# Patient Record
Sex: MALE | Race: WHITE | NOT HISPANIC OR LATINO | URBAN - METROPOLITAN AREA
[De-identification: names, ages, dates, MRNs, and addresses within clinical notes are randomized per-mention and may not be internally consistent; named-entity substitution may affect disease eponyms.]

---

## 2018-03-16 ENCOUNTER — OUTPATIENT (OUTPATIENT)
Dept: OUTPATIENT SERVICES | Facility: HOSPITAL | Age: 78
LOS: 1 days | End: 2018-03-16
Payer: COMMERCIAL

## 2018-03-16 VITALS
HEIGHT: 68 IN | RESPIRATION RATE: 15 BRPM | TEMPERATURE: 98 F | HEART RATE: 70 BPM | WEIGHT: 171.96 LBS | DIASTOLIC BLOOD PRESSURE: 80 MMHG | SYSTOLIC BLOOD PRESSURE: 146 MMHG

## 2018-03-16 DIAGNOSIS — Z01.818 ENCOUNTER FOR OTHER PREPROCEDURAL EXAMINATION: ICD-10-CM

## 2018-03-16 DIAGNOSIS — Z41.9 ENCOUNTER FOR PROCEDURE FOR PURPOSES OTHER THAN REMEDYING HEALTH STATE, UNSPECIFIED: Chronic | ICD-10-CM

## 2018-03-16 DIAGNOSIS — M48.061 SPINAL STENOSIS, LUMBAR REGION WITHOUT NEUROGENIC CLAUDICATION: ICD-10-CM

## 2018-03-16 LAB
ALBUMIN SERPL ELPH-MCNC: 3.7 G/DL — SIGNIFICANT CHANGE UP (ref 3.3–5)
ALP SERPL-CCNC: 74 U/L — SIGNIFICANT CHANGE UP (ref 40–120)
ALT FLD-CCNC: 31 U/L — SIGNIFICANT CHANGE UP (ref 12–78)
ANION GAP SERPL CALC-SCNC: 5 MMOL/L — SIGNIFICANT CHANGE UP (ref 5–17)
APTT BLD: 32.8 SEC — SIGNIFICANT CHANGE UP (ref 27.5–37.4)
AST SERPL-CCNC: 26 U/L — SIGNIFICANT CHANGE UP (ref 15–37)
BILIRUB SERPL-MCNC: 0.6 MG/DL — SIGNIFICANT CHANGE UP (ref 0.2–1.2)
BUN SERPL-MCNC: 27 MG/DL — HIGH (ref 7–23)
CALCIUM SERPL-MCNC: 8.8 MG/DL — SIGNIFICANT CHANGE UP (ref 8.5–10.1)
CHLORIDE SERPL-SCNC: 109 MMOL/L — HIGH (ref 96–108)
CO2 SERPL-SCNC: 29 MMOL/L — SIGNIFICANT CHANGE UP (ref 22–31)
CREAT SERPL-MCNC: 1.4 MG/DL — HIGH (ref 0.5–1.3)
GLUCOSE SERPL-MCNC: 92 MG/DL — SIGNIFICANT CHANGE UP (ref 70–99)
HCT VFR BLD CALC: 49.1 % — SIGNIFICANT CHANGE UP (ref 39–50)
HGB BLD-MCNC: 16.6 G/DL — SIGNIFICANT CHANGE UP (ref 13–17)
INR BLD: 1.04 RATIO — SIGNIFICANT CHANGE UP (ref 0.88–1.16)
MCHC RBC-ENTMCNC: 31.3 PG — SIGNIFICANT CHANGE UP (ref 27–34)
MCHC RBC-ENTMCNC: 33.8 GM/DL — SIGNIFICANT CHANGE UP (ref 32–36)
MCV RBC AUTO: 92.6 FL — SIGNIFICANT CHANGE UP (ref 80–100)
PLATELET # BLD AUTO: 258 K/UL — SIGNIFICANT CHANGE UP (ref 150–400)
POTASSIUM SERPL-MCNC: 4.3 MMOL/L — SIGNIFICANT CHANGE UP (ref 3.5–5.3)
POTASSIUM SERPL-SCNC: 4.3 MMOL/L — SIGNIFICANT CHANGE UP (ref 3.5–5.3)
PROT SERPL-MCNC: 6.8 G/DL — SIGNIFICANT CHANGE UP (ref 6–8.3)
PROTHROM AB SERPL-ACNC: 11.4 SEC — SIGNIFICANT CHANGE UP (ref 9.8–12.7)
RBC # BLD: 5.3 M/UL — SIGNIFICANT CHANGE UP (ref 4.2–5.8)
RBC # FLD: 11.5 % — SIGNIFICANT CHANGE UP (ref 10.3–14.5)
SODIUM SERPL-SCNC: 143 MMOL/L — SIGNIFICANT CHANGE UP (ref 135–145)
WBC # BLD: 6.4 K/UL — SIGNIFICANT CHANGE UP (ref 3.8–10.5)
WBC # FLD AUTO: 6.4 K/UL — SIGNIFICANT CHANGE UP (ref 3.8–10.5)

## 2018-03-16 PROCEDURE — 93010 ELECTROCARDIOGRAM REPORT: CPT | Mod: NC

## 2018-03-16 PROCEDURE — 80053 COMPREHEN METABOLIC PANEL: CPT

## 2018-03-16 PROCEDURE — 86850 RBC ANTIBODY SCREEN: CPT

## 2018-03-16 PROCEDURE — 85027 COMPLETE CBC AUTOMATED: CPT

## 2018-03-16 PROCEDURE — 93005 ELECTROCARDIOGRAM TRACING: CPT

## 2018-03-16 PROCEDURE — 86901 BLOOD TYPING SEROLOGIC RH(D): CPT

## 2018-03-16 PROCEDURE — 72110 X-RAY EXAM L-2 SPINE 4/>VWS: CPT | Mod: 26

## 2018-03-16 PROCEDURE — 36415 COLL VENOUS BLD VENIPUNCTURE: CPT

## 2018-03-16 PROCEDURE — 87640 STAPH A DNA AMP PROBE: CPT

## 2018-03-16 PROCEDURE — 87641 MR-STAPH DNA AMP PROBE: CPT

## 2018-03-16 PROCEDURE — 85610 PROTHROMBIN TIME: CPT

## 2018-03-16 PROCEDURE — 85730 THROMBOPLASTIN TIME PARTIAL: CPT

## 2018-03-16 PROCEDURE — 86900 BLOOD TYPING SEROLOGIC ABO: CPT

## 2018-03-16 PROCEDURE — 72110 X-RAY EXAM L-2 SPINE 4/>VWS: CPT

## 2018-03-16 PROCEDURE — G0463: CPT

## 2018-03-16 NOTE — H&P PST ADULT - PSH
Elective surgery  Mohs  neck 2018  Elective surgery  cataract extraction 2018  Elective surgery  cervical fusion 2013  H/O left knee surgery  1994 - repair of torn meniscus  Inguinal hernia  h/o repair - right

## 2018-03-16 NOTE — H&P PST ADULT - NSANTHOSAYNRD_GEN_A_CORE
No. RACHELLE screening performed.  STOP BANG Legend: 0-2 = LOW Risk; 3-4 = INTERMEDIATE Risk; 5-8 = HIGH Risk

## 2018-03-16 NOTE — H&P PST ADULT - HISTORY OF PRESENT ILLNESS
76 yo M for L2-S1 laminectomy   instrumented fusion   autograft  allograft  plastic closure utilizing fluoroscopy - cellsaver  c/o back pain and numbness to bilat feet X 1 year

## 2018-03-16 NOTE — H&P PST ADULT - PMH
Arthritis    Back pain    BPH (benign prostatic hypertrophy)    [place] (hard of hearing)  right hearing aid  HTN (hypertension)    Numbness    Sciatica    Skin cancer    Spinal stenosis in cervical region

## 2018-03-16 NOTE — H&P PST ADULT - ASSESSMENT
76 yo M for L2-S1 laminectomy   instrumented  fusion - autograft -allograft - plastic closure utilizing fluoro - cellsEzFlop - A First of Its Kind Flip Flop     Med cl pending     Seen by anesthesia  - Miley

## 2018-03-17 LAB
MRSA PCR RESULT.: SIGNIFICANT CHANGE UP
S AUREUS DNA NOSE QL NAA+PROBE: DETECTED

## 2018-03-19 RX ORDER — MUPIROCIN 20 MG/G
1 OINTMENT TOPICAL
Qty: 1 | Refills: 0 | OUTPATIENT
Start: 2018-03-19 | End: 2018-03-23

## 2018-03-28 ENCOUNTER — TRANSCRIPTION ENCOUNTER (OUTPATIENT)
Age: 78
End: 2018-03-28

## 2018-03-28 RX ORDER — SODIUM CHLORIDE 9 MG/ML
1000 INJECTION, SOLUTION INTRAVENOUS
Qty: 0 | Refills: 0 | Status: DISCONTINUED | OUTPATIENT
Start: 2018-03-29 | End: 2018-03-29

## 2018-03-28 RX ORDER — ACETAMINOPHEN 500 MG
1000 TABLET ORAL ONCE
Qty: 0 | Refills: 0 | Status: COMPLETED | OUTPATIENT
Start: 2018-03-29 | End: 2018-03-29

## 2018-03-28 RX ORDER — BUPIVACAINE 13.3 MG/ML
20 INJECTION, SUSPENSION, LIPOSOMAL INFILTRATION ONCE
Qty: 0 | Refills: 0 | Status: DISCONTINUED | OUTPATIENT
Start: 2018-03-29 | End: 2018-03-29

## 2018-03-28 RX ORDER — CEFAZOLIN SODIUM 1 G
2000 VIAL (EA) INJECTION ONCE
Qty: 0 | Refills: 0 | Status: COMPLETED | OUTPATIENT
Start: 2018-03-29 | End: 2018-03-29

## 2018-03-29 ENCOUNTER — INPATIENT (INPATIENT)
Facility: HOSPITAL | Age: 78
LOS: 4 days | Discharge: NURSING, REHAB OR LONG-TERM CARE | DRG: 460 | End: 2018-04-03
Attending: ORTHOPAEDIC SURGERY | Admitting: ORTHOPAEDIC SURGERY
Payer: COMMERCIAL

## 2018-03-29 ENCOUNTER — TRANSCRIPTION ENCOUNTER (OUTPATIENT)
Age: 78
End: 2018-03-29

## 2018-03-29 VITALS
WEIGHT: 166.89 LBS | OXYGEN SATURATION: 97 % | HEART RATE: 68 BPM | DIASTOLIC BLOOD PRESSURE: 80 MMHG | HEIGHT: 69 IN | TEMPERATURE: 98 F | SYSTOLIC BLOOD PRESSURE: 140 MMHG | RESPIRATION RATE: 20 BRPM

## 2018-03-29 DIAGNOSIS — Z29.9 ENCOUNTER FOR PROPHYLACTIC MEASURES, UNSPECIFIED: ICD-10-CM

## 2018-03-29 DIAGNOSIS — Z41.9 ENCOUNTER FOR PROCEDURE FOR PURPOSES OTHER THAN REMEDYING HEALTH STATE, UNSPECIFIED: Chronic | ICD-10-CM

## 2018-03-29 DIAGNOSIS — Z78.9 OTHER SPECIFIED HEALTH STATUS: ICD-10-CM

## 2018-03-29 DIAGNOSIS — M48.061 SPINAL STENOSIS, LUMBAR REGION WITHOUT NEUROGENIC CLAUDICATION: ICD-10-CM

## 2018-03-29 DIAGNOSIS — N40.0 BENIGN PROSTATIC HYPERPLASIA WITHOUT LOWER URINARY TRACT SYMPTOMS: ICD-10-CM

## 2018-03-29 DIAGNOSIS — I10 ESSENTIAL (PRIMARY) HYPERTENSION: ICD-10-CM

## 2018-03-29 DIAGNOSIS — R31.9 HEMATURIA, UNSPECIFIED: ICD-10-CM

## 2018-03-29 LAB
ABO RH CONFIRMATION: SIGNIFICANT CHANGE UP
ANION GAP SERPL CALC-SCNC: 8 MMOL/L — SIGNIFICANT CHANGE UP (ref 5–17)
ANION GAP SERPL CALC-SCNC: 8 MMOL/L — SIGNIFICANT CHANGE UP (ref 5–17)
APPEARANCE UR: ABNORMAL
BASOPHILS # BLD AUTO: 0 K/UL — SIGNIFICANT CHANGE UP (ref 0–0.2)
BASOPHILS NFR BLD AUTO: 0.3 % — SIGNIFICANT CHANGE UP (ref 0–2)
BILIRUB UR-MCNC: NEGATIVE — SIGNIFICANT CHANGE UP
BUN SERPL-MCNC: 21 MG/DL — SIGNIFICANT CHANGE UP (ref 7–23)
BUN SERPL-MCNC: 22 MG/DL — SIGNIFICANT CHANGE UP (ref 7–23)
CALCIUM SERPL-MCNC: 7.9 MG/DL — LOW (ref 8.5–10.1)
CALCIUM SERPL-MCNC: 7.9 MG/DL — LOW (ref 8.5–10.1)
CHLORIDE SERPL-SCNC: 109 MMOL/L — HIGH (ref 96–108)
CHLORIDE SERPL-SCNC: 110 MMOL/L — HIGH (ref 96–108)
CO2 SERPL-SCNC: 23 MMOL/L — SIGNIFICANT CHANGE UP (ref 22–31)
CO2 SERPL-SCNC: 25 MMOL/L — SIGNIFICANT CHANGE UP (ref 22–31)
COLOR SPEC: ABNORMAL
CREAT SERPL-MCNC: 1.3 MG/DL — SIGNIFICANT CHANGE UP (ref 0.5–1.3)
CREAT SERPL-MCNC: 1.4 MG/DL — HIGH (ref 0.5–1.3)
DIFF PNL FLD: ABNORMAL
EOSINOPHIL # BLD AUTO: 0 K/UL — SIGNIFICANT CHANGE UP (ref 0–0.5)
EOSINOPHIL NFR BLD AUTO: 0.1 % — SIGNIFICANT CHANGE UP (ref 0–6)
GLUCOSE SERPL-MCNC: 123 MG/DL — HIGH (ref 70–99)
GLUCOSE SERPL-MCNC: 153 MG/DL — HIGH (ref 70–99)
GLUCOSE UR QL: NEGATIVE — SIGNIFICANT CHANGE UP
HCT VFR BLD CALC: 39.8 % — SIGNIFICANT CHANGE UP (ref 39–50)
HCT VFR BLD CALC: 42.3 % — SIGNIFICANT CHANGE UP (ref 39–50)
HGB BLD-MCNC: 13.6 G/DL — SIGNIFICANT CHANGE UP (ref 13–17)
HGB BLD-MCNC: 14.3 G/DL — SIGNIFICANT CHANGE UP (ref 13–17)
KETONES UR-MCNC: ABNORMAL
LEUKOCYTE ESTERASE UR-ACNC: ABNORMAL
LYMPHOCYTES # BLD AUTO: 0.3 K/UL — LOW (ref 1–3.3)
LYMPHOCYTES # BLD AUTO: 2 % — LOW (ref 13–44)
MCHC RBC-ENTMCNC: 31.7 PG — SIGNIFICANT CHANGE UP (ref 27–34)
MCHC RBC-ENTMCNC: 33.8 GM/DL — SIGNIFICANT CHANGE UP (ref 32–36)
MCV RBC AUTO: 93.9 FL — SIGNIFICANT CHANGE UP (ref 80–100)
MONOCYTES # BLD AUTO: 0.4 K/UL — SIGNIFICANT CHANGE UP (ref 0–0.9)
MONOCYTES NFR BLD AUTO: 2.2 % — SIGNIFICANT CHANGE UP (ref 1–9)
NEUTROPHILS # BLD AUTO: 16 K/UL — HIGH (ref 1.8–7.4)
NEUTROPHILS NFR BLD AUTO: 95.5 % — HIGH (ref 43–77)
NITRITE UR-MCNC: NEGATIVE — SIGNIFICANT CHANGE UP
PH UR: 5 — SIGNIFICANT CHANGE UP (ref 5–8)
PLATELET # BLD AUTO: 200 K/UL — SIGNIFICANT CHANGE UP (ref 150–400)
POTASSIUM SERPL-MCNC: 4 MMOL/L — SIGNIFICANT CHANGE UP (ref 3.5–5.3)
POTASSIUM SERPL-MCNC: 4.2 MMOL/L — SIGNIFICANT CHANGE UP (ref 3.5–5.3)
POTASSIUM SERPL-SCNC: 4 MMOL/L — SIGNIFICANT CHANGE UP (ref 3.5–5.3)
POTASSIUM SERPL-SCNC: 4.2 MMOL/L — SIGNIFICANT CHANGE UP (ref 3.5–5.3)
PROT UR-MCNC: 75 MG/DL
RBC # BLD: 4.51 M/UL — SIGNIFICANT CHANGE UP (ref 4.2–5.8)
RBC # FLD: 11.5 % — SIGNIFICANT CHANGE UP (ref 10.3–14.5)
SODIUM SERPL-SCNC: 141 MMOL/L — SIGNIFICANT CHANGE UP (ref 135–145)
SODIUM SERPL-SCNC: 142 MMOL/L — SIGNIFICANT CHANGE UP (ref 135–145)
SP GR SPEC: 1.01 — SIGNIFICANT CHANGE UP (ref 1.01–1.02)
UROBILINOGEN FLD QL: NEGATIVE — SIGNIFICANT CHANGE UP
WBC # BLD: 16.8 K/UL — HIGH (ref 3.8–10.5)
WBC # FLD AUTO: 16.8 K/UL — HIGH (ref 3.8–10.5)

## 2018-03-29 PROCEDURE — 99291 CRITICAL CARE FIRST HOUR: CPT

## 2018-03-29 RX ORDER — THIAMINE MONONITRATE (VIT B1) 100 MG
100 TABLET ORAL DAILY
Qty: 0 | Refills: 0 | Status: DISCONTINUED | OUTPATIENT
Start: 2018-03-29 | End: 2018-03-30

## 2018-03-29 RX ORDER — FOLIC ACID 0.8 MG
1 TABLET ORAL DAILY
Qty: 0 | Refills: 0 | Status: DISCONTINUED | OUTPATIENT
Start: 2018-03-29 | End: 2018-03-30

## 2018-03-29 RX ORDER — HYDROMORPHONE HYDROCHLORIDE 2 MG/ML
0.5 INJECTION INTRAMUSCULAR; INTRAVENOUS; SUBCUTANEOUS
Qty: 0 | Refills: 0 | Status: DISCONTINUED | OUTPATIENT
Start: 2018-03-29 | End: 2018-03-29

## 2018-03-29 RX ORDER — ONDANSETRON 8 MG/1
4 TABLET, FILM COATED ORAL EVERY 6 HOURS
Qty: 0 | Refills: 0 | Status: DISCONTINUED | OUTPATIENT
Start: 2018-03-29 | End: 2018-04-03

## 2018-03-29 RX ORDER — TAMSULOSIN HYDROCHLORIDE 0.4 MG/1
0.4 CAPSULE ORAL DAILY
Qty: 0 | Refills: 0 | Status: DISCONTINUED | OUTPATIENT
Start: 2018-03-29 | End: 2018-04-03

## 2018-03-29 RX ORDER — BENZOCAINE AND MENTHOL 5; 1 G/100ML; G/100ML
1 LIQUID ORAL
Qty: 0 | Refills: 0 | Status: DISCONTINUED | OUTPATIENT
Start: 2018-03-29 | End: 2018-04-03

## 2018-03-29 RX ORDER — HYDROMORPHONE HYDROCHLORIDE 2 MG/ML
1 INJECTION INTRAMUSCULAR; INTRAVENOUS; SUBCUTANEOUS EVERY 4 HOURS
Qty: 0 | Refills: 0 | Status: DISCONTINUED | OUTPATIENT
Start: 2018-03-29 | End: 2018-04-02

## 2018-03-29 RX ORDER — DEXAMETHASONE 0.5 MG/5ML
10 ELIXIR ORAL EVERY 8 HOURS
Qty: 0 | Refills: 0 | Status: COMPLETED | OUTPATIENT
Start: 2018-03-29 | End: 2018-03-30

## 2018-03-29 RX ORDER — CEFAZOLIN SODIUM 1 G
2000 VIAL (EA) INJECTION EVERY 8 HOURS
Qty: 0 | Refills: 0 | Status: COMPLETED | OUTPATIENT
Start: 2018-03-29 | End: 2018-03-30

## 2018-03-29 RX ORDER — AMLODIPINE BESYLATE 2.5 MG/1
1 TABLET ORAL
Qty: 0 | Refills: 0 | COMMUNITY

## 2018-03-29 RX ORDER — CYCLOBENZAPRINE HYDROCHLORIDE 10 MG/1
1 TABLET, FILM COATED ORAL
Qty: 21 | Refills: 0 | OUTPATIENT
Start: 2018-03-29 | End: 2018-04-04

## 2018-03-29 RX ORDER — MAGNESIUM HYDROXIDE 400 MG/1
30 TABLET, CHEWABLE ORAL EVERY 12 HOURS
Qty: 0 | Refills: 0 | Status: DISCONTINUED | OUTPATIENT
Start: 2018-03-29 | End: 2018-04-03

## 2018-03-29 RX ORDER — DIPHENHYDRAMINE HCL 50 MG
25 CAPSULE ORAL AT BEDTIME
Qty: 0 | Refills: 0 | Status: DISCONTINUED | OUTPATIENT
Start: 2018-03-29 | End: 2018-03-29

## 2018-03-29 RX ORDER — DOCUSATE SODIUM 100 MG
1 CAPSULE ORAL
Qty: 21 | Refills: 0 | OUTPATIENT
Start: 2018-03-29 | End: 2018-04-04

## 2018-03-29 RX ORDER — ACETAMINOPHEN 500 MG
650 TABLET ORAL EVERY 6 HOURS
Qty: 0 | Refills: 0 | Status: DISCONTINUED | OUTPATIENT
Start: 2018-03-29 | End: 2018-04-03

## 2018-03-29 RX ORDER — DIPHENHYDRAMINE HCL 50 MG
25 CAPSULE ORAL EVERY 4 HOURS
Qty: 0 | Refills: 0 | Status: DISCONTINUED | OUTPATIENT
Start: 2018-03-29 | End: 2018-03-29

## 2018-03-29 RX ORDER — OXYCODONE HYDROCHLORIDE 5 MG/1
10 TABLET ORAL EVERY 6 HOURS
Qty: 0 | Refills: 0 | Status: DISCONTINUED | OUTPATIENT
Start: 2018-03-29 | End: 2018-03-29

## 2018-03-29 RX ORDER — OXYCODONE HYDROCHLORIDE 5 MG/1
5 TABLET ORAL EVERY 4 HOURS
Qty: 0 | Refills: 0 | Status: DISCONTINUED | OUTPATIENT
Start: 2018-03-29 | End: 2018-04-03

## 2018-03-29 RX ORDER — FAMOTIDINE 10 MG/ML
20 INJECTION INTRAVENOUS EVERY 24 HOURS
Qty: 0 | Refills: 0 | Status: DISCONTINUED | OUTPATIENT
Start: 2018-03-29 | End: 2018-03-30

## 2018-03-29 RX ORDER — OXYCODONE HYDROCHLORIDE 5 MG/1
10 TABLET ORAL EVERY 4 HOURS
Qty: 0 | Refills: 0 | Status: DISCONTINUED | OUTPATIENT
Start: 2018-03-29 | End: 2018-04-03

## 2018-03-29 RX ORDER — GABAPENTIN 400 MG/1
300 CAPSULE ORAL THREE TIMES A DAY
Qty: 0 | Refills: 0 | Status: DISCONTINUED | OUTPATIENT
Start: 2018-03-29 | End: 2018-03-29

## 2018-03-29 RX ORDER — SODIUM CHLORIDE 9 MG/ML
1000 INJECTION, SOLUTION INTRAVENOUS
Qty: 0 | Refills: 0 | Status: DISCONTINUED | OUTPATIENT
Start: 2018-03-29 | End: 2018-03-29

## 2018-03-29 RX ORDER — SODIUM CHLORIDE 9 MG/ML
1000 INJECTION, SOLUTION INTRAVENOUS
Qty: 0 | Refills: 0 | Status: DISCONTINUED | OUTPATIENT
Start: 2018-03-29 | End: 2018-03-30

## 2018-03-29 RX ORDER — TAMSULOSIN HYDROCHLORIDE 0.4 MG/1
1 CAPSULE ORAL
Qty: 0 | Refills: 0 | COMMUNITY

## 2018-03-29 RX ORDER — AMLODIPINE BESYLATE 2.5 MG/1
2.5 TABLET ORAL DAILY
Qty: 0 | Refills: 0 | Status: DISCONTINUED | OUTPATIENT
Start: 2018-03-29 | End: 2018-03-29

## 2018-03-29 RX ORDER — ONDANSETRON 8 MG/1
1 TABLET, FILM COATED ORAL
Qty: 10 | Refills: 0 | OUTPATIENT
Start: 2018-03-29

## 2018-03-29 RX ORDER — FINASTERIDE 5 MG/1
1 TABLET, FILM COATED ORAL
Qty: 0 | Refills: 0 | COMMUNITY

## 2018-03-29 RX ORDER — METOPROLOL TARTRATE 50 MG
0 TABLET ORAL
Qty: 30 | Refills: 0 | COMMUNITY

## 2018-03-29 RX ORDER — DOCUSATE SODIUM 100 MG
100 CAPSULE ORAL THREE TIMES A DAY
Qty: 0 | Refills: 0 | Status: DISCONTINUED | OUTPATIENT
Start: 2018-03-29 | End: 2018-04-03

## 2018-03-29 RX ORDER — CEFAZOLIN SODIUM 1 G
2000 VIAL (EA) INJECTION EVERY 8 HOURS
Qty: 0 | Refills: 0 | Status: DISCONTINUED | OUTPATIENT
Start: 2018-03-30 | End: 2018-04-03

## 2018-03-29 RX ORDER — DIAZEPAM 5 MG
5 TABLET ORAL EVERY 8 HOURS
Qty: 0 | Refills: 0 | Status: DISCONTINUED | OUTPATIENT
Start: 2018-03-29 | End: 2018-04-03

## 2018-03-29 RX ORDER — FINASTERIDE 5 MG/1
5 TABLET, FILM COATED ORAL DAILY
Qty: 0 | Refills: 0 | Status: DISCONTINUED | OUTPATIENT
Start: 2018-03-29 | End: 2018-04-03

## 2018-03-29 RX ORDER — METOPROLOL TARTRATE 50 MG
25 TABLET ORAL DAILY
Qty: 0 | Refills: 0 | Status: DISCONTINUED | OUTPATIENT
Start: 2018-03-29 | End: 2018-03-29

## 2018-03-29 RX ORDER — OXYCODONE HYDROCHLORIDE 5 MG/1
5 TABLET ORAL EVERY 4 HOURS
Qty: 0 | Refills: 0 | Status: DISCONTINUED | OUTPATIENT
Start: 2018-03-29 | End: 2018-03-29

## 2018-03-29 RX ADMIN — TAMSULOSIN HYDROCHLORIDE 0.4 MILLIGRAM(S): 0.4 CAPSULE ORAL at 18:00

## 2018-03-29 RX ADMIN — HYDROMORPHONE HYDROCHLORIDE 0.5 MILLIGRAM(S): 2 INJECTION INTRAMUSCULAR; INTRAVENOUS; SUBCUTANEOUS at 13:35

## 2018-03-29 RX ADMIN — HYDROMORPHONE HYDROCHLORIDE 1 MILLIGRAM(S): 2 INJECTION INTRAMUSCULAR; INTRAVENOUS; SUBCUTANEOUS at 17:24

## 2018-03-29 RX ADMIN — HYDROMORPHONE HYDROCHLORIDE 0.5 MILLIGRAM(S): 2 INJECTION INTRAMUSCULAR; INTRAVENOUS; SUBCUTANEOUS at 13:08

## 2018-03-29 RX ADMIN — HYDROMORPHONE HYDROCHLORIDE 1 MILLIGRAM(S): 2 INJECTION INTRAMUSCULAR; INTRAVENOUS; SUBCUTANEOUS at 20:24

## 2018-03-29 RX ADMIN — HYDROMORPHONE HYDROCHLORIDE 1 MILLIGRAM(S): 2 INJECTION INTRAMUSCULAR; INTRAVENOUS; SUBCUTANEOUS at 16:10

## 2018-03-29 RX ADMIN — OXYCODONE HYDROCHLORIDE 10 MILLIGRAM(S): 5 TABLET ORAL at 19:16

## 2018-03-29 RX ADMIN — OXYCODONE HYDROCHLORIDE 10 MILLIGRAM(S): 5 TABLET ORAL at 23:30

## 2018-03-29 RX ADMIN — OXYCODONE HYDROCHLORIDE 10 MILLIGRAM(S): 5 TABLET ORAL at 18:00

## 2018-03-29 RX ADMIN — SODIUM CHLORIDE 75 MILLILITER(S): 9 INJECTION, SOLUTION INTRAVENOUS at 07:04

## 2018-03-29 RX ADMIN — Medication 5 MILLIGRAM(S): at 22:39

## 2018-03-29 RX ADMIN — Medication 25 MILLIGRAM(S): at 18:00

## 2018-03-29 RX ADMIN — Medication 102 MILLIGRAM(S): at 22:19

## 2018-03-29 RX ADMIN — HYDROMORPHONE HYDROCHLORIDE 0.5 MILLIGRAM(S): 2 INJECTION INTRAMUSCULAR; INTRAVENOUS; SUBCUTANEOUS at 12:27

## 2018-03-29 RX ADMIN — OXYCODONE HYDROCHLORIDE 10 MILLIGRAM(S): 5 TABLET ORAL at 22:39

## 2018-03-29 RX ADMIN — Medication 102 MILLIGRAM(S): at 15:06

## 2018-03-29 RX ADMIN — FINASTERIDE 5 MILLIGRAM(S): 5 TABLET, FILM COATED ORAL at 18:00

## 2018-03-29 RX ADMIN — Medication 100 MILLIGRAM(S): at 16:41

## 2018-03-29 RX ADMIN — Medication 1 TABLET(S): at 22:19

## 2018-03-29 RX ADMIN — SODIUM CHLORIDE 100 MILLILITER(S): 9 INJECTION, SOLUTION INTRAVENOUS at 12:27

## 2018-03-29 RX ADMIN — HYDROMORPHONE HYDROCHLORIDE 1 MILLIGRAM(S): 2 INJECTION INTRAMUSCULAR; INTRAVENOUS; SUBCUTANEOUS at 20:35

## 2018-03-29 RX ADMIN — Medication 100 MILLIGRAM(S): at 18:00

## 2018-03-29 RX ADMIN — SODIUM CHLORIDE 75 MILLILITER(S): 9 INJECTION, SOLUTION INTRAVENOUS at 22:20

## 2018-03-29 RX ADMIN — AMLODIPINE BESYLATE 2.5 MILLIGRAM(S): 2.5 TABLET ORAL at 18:00

## 2018-03-29 RX ADMIN — Medication 100 MILLIGRAM(S): at 22:19

## 2018-03-29 NOTE — PATIENT PROFILE ADULT. - PMH
Arthritis    Back pain    BPH (benign prostatic hypertrophy)    [place] (hard of hearing)  right hearing aid  HTN (hypertension)    Numbness    Sciatica    Skin cancer    Spinal stenosis in cervical region

## 2018-03-29 NOTE — DISCHARGE NOTE ADULT - ADDITIONAL INSTRUCTIONS
Follow up with Dr. Orr as Outpatient in 7-10 days after discharge from the hospital or rehab. Call office for appointment. Follow up with Dr. Orr as Outpatient in 7-10 days after discharge from the hospital or rehab. Call office for appointment.  Follow up with Dr. Stokes's office outpatient 3-5 days after discharge for drain management, call office for appointment.

## 2018-03-29 NOTE — CONSULT NOTE ADULT - PROBLEM SELECTOR RECOMMENDATION 5
- hx of EtOH use- thiamine, folic acid daily  - SCD's for DVT prophylaxis - hx of EtOH use- thiamine, folic acid daily  - monitor closely for signs of withdrawal.

## 2018-03-29 NOTE — PROGRESS NOTE ADULT - SUBJECTIVE AND OBJECTIVE BOX
Post-op check  Pt S/E at bedside, pain controlled    Vital Signs Last 24 Hrs  T(C): 36.7 (29 Mar 2018 11:34), Max: 36.7 (29 Mar 2018 11:34)  T(F): 98.1 (29 Mar 2018 11:34), Max: 98.1 (29 Mar 2018 11:34)  HR: 76 (29 Mar 2018 12:19) (68 - 78)  BP: 98/56 (29 Mar 2018 12:19) (88/46 - 140/80)  BP(mean): --  RR: 16 (29 Mar 2018 12:19) (16 - 20)  SpO2: 93% (29 Mar 2018 12:19) (93% - 97%)    Gen: NAD,     Spine:  Dressing clean dry intact  +HMV  +HEATHER  +EHL/FHL/TA/GS  +AIN/PIN/M/R/U/Msc/Ax  SILT L3-S1  SILT C5-T1  +DP/PT Pulses  Compartments soft  No calf TTP B/L

## 2018-03-29 NOTE — DISCHARGE NOTE ADULT - PATIENT PORTAL LINK FT
You can access the [facility] Patient Portal, offered by [facility], by registering with the following website: [email or web address]

## 2018-03-29 NOTE — BRIEF OPERATIVE NOTE - PROCEDURE
<<-----Click on this checkbox to enter Procedure Laminectomy and fusion of lumbar spine with instrumentation  03/29/2018  L2-S1  Active  FEDE

## 2018-03-29 NOTE — CONSULT NOTE ADULT - SUBJECTIVE AND OBJECTIVE BOX
Patient is a 77y old  Male w/ spinal stenosis, HTN, BPH, +ETOH-drinks 2-3 beers/day who presents post op s/p L2-S1 laminectomies and spinal fusion.  EBL 1200cc, 500cc back by cellsaver.  Pt alert and responsive. RN reports he was not moving his Rt lower extremity but now moving.   pt c/o back pain.      PAST MEDICAL & SURGICAL HISTORY:  Skin cancer  Sciatica  HTN (hypertension)  Numbness  Back pain  Arthritis  [place] (hard of hearing): right hearing aid  Spinal stenosis in cervical region  BPH (benign prostatic hypertrophy)  Elective surgery: Mohs  neck 2018  Elective surgery: cataract extraction 2018  Elective surgery: cervical fusion 2013  H/O left knee surgery: 1994 - repair of torn meniscus  Inguinal hernia: h/o repair - right      BRIEF HOSPITAL COURSE:    Review of Systems:    Neg dyspnea, cough, or HU  Neg chestpain, palpitations, or PND  +sciatica  +numness to upper extremities                                                  All other ROS are negative.    ICU Vital Signs Last 24 Hrs  T(C): 36.7 (29 Mar 2018 11:34), Max: 36.7 (29 Mar 2018 11:34)  T(F): 98.1 (29 Mar 2018 11:34), Max: 98.1 (29 Mar 2018 11:34)  HR: 76 (29 Mar 2018 12:19) (68 - 78)  BP: 98/56 (29 Mar 2018 12:19) (88/46 - 140/80)  BP(mean): --  ABP: --  ABP(mean): --  RR: 16 (29 Mar 2018 12:19) (16 - 20)  SpO2: 93% (29 Mar 2018 12:19) (93% - 97%)    Physical Examination:    General: well developed, non-toxic appearance     HEENT: normacephalic pupils equal     PULM: CTA b/l no wheezes     CVS: s1s2 RRR    ABD:  soft +BS nt nd     EXT: no edema, moves all extremities     SKIN: no cyanosis     Neuro: alert, oriented, follows commands and answers simple questions.          LABS:                        14.3   16.8  )-----------( 200      ( 29 Mar 2018 12:53 )             42.3     03-29    142  |  109<H>  |  22  ----------------------------<  123<H>  4.0   |  25  |  1.40<H>    Ca    7.9<L>      29 Mar 2018 12:53            CAPILLARY BLOOD GLUCOSE              CULTURES:      Medications:  ceFAZolin   IVPB 2000 milliGRAM(s) IV Intermittent every 8 hours  ceFAZolin   IVPB 2000 milliGRAM(s) IV Intermittent every 8 hours        HYDROmorphone  Injectable 0.5 milliGRAM(s) IV Push every 10 minutes PRN  oxyCODONE    IR 5 milliGRAM(s) Oral every 4 hours PRN  oxyCODONE    IR 10 milliGRAM(s) Oral every 6 hours PRN  promethazine IVPB 3.125 milliGRAM(s) IV Intermittent once PRN            dexamethasone  IVPB 10 milliGRAM(s) IV Intermittent every 8 hours    lactated ringers. 1000 milliLiter(s) IV Continuous <Continuous>            RADIOLOGY/IMAGING/ECHO    Critical care point of care ultrasound:    Assessment/Plan:   77y old  Male w/ spinal stenosis, HTN, BPH, +ETOH-drinks 2-3 beers/day who presents post op s/p L2-S1 laminectomies and spinal fusion.    NS-supportive care, pain control, neurovascular checks, repeat labs, transfuse to keep Hgb>8.5 as per NS team, cont Decadron 10mg q8X3 days  CV-BP stable, keep MAP>80, cont amlodipine   Pulm-stable on nc   GI-advance diet as tolerated   Renal-Cr stable, adequate UO, repeat stat labs, cont LR 100cc/hr  ID-emperic Cefazolin  ICU-DVT prophylaxis, int sequential stockings, hold chemical AC    Discuss w/ Dr Luna and agrees with plan         Critical Care time: 35 min   (Reviewing data, imaging, discussing with multidisciplinary team, non inclusive of procedures, discussing goals of care with patient/family) Patient is a 77y old  Male w/ spinal stenosis, HTN, BPH, +ETOH-drinks 2-3 beers/day who presents post op s/p L2-S1 laminectomies and spinal fusion.  EBL 1200cc, 500cc back by cellsaver.  Pt alert and responsive. RN reports he was not moving his Rt lower extremity but now moving.   pt c/o back pain.      PAST MEDICAL & SURGICAL HISTORY:  Skin cancer  Sciatica  HTN (hypertension)  Numbness  Back pain  Arthritis  [place] (hard of hearing): right hearing aid  Spinal stenosis in cervical region  BPH (benign prostatic hypertrophy)  Elective surgery: Mohs  neck 2018  Elective surgery: cataract extraction 2018  Elective surgery: cervical fusion 2013  H/O left knee surgery: 1994 - repair of torn meniscus  Inguinal hernia: h/o repair - right      BRIEF HOSPITAL COURSE:    Review of Systems:    Neg dyspnea, cough, or HU  Neg chestpain, palpitations, or PND  +sciatica  +numness to upper extremities                                                  All other ROS are negative.    ICU Vital Signs Last 24 Hrs  T(C): 36.7 (29 Mar 2018 11:34), Max: 36.7 (29 Mar 2018 11:34)  T(F): 98.1 (29 Mar 2018 11:34), Max: 98.1 (29 Mar 2018 11:34)  HR: 76 (29 Mar 2018 12:19) (68 - 78)  BP: 98/56 (29 Mar 2018 12:19) (88/46 - 140/80)  BP(mean): --  ABP: --  ABP(mean): --  RR: 16 (29 Mar 2018 12:19) (16 - 20)  SpO2: 93% (29 Mar 2018 12:19) (93% - 97%)    Physical Examination:    General: well developed, non-toxic appearance     HEENT: normacephalic pupils equal     PULM: CTA b/l no wheezes     CVS: s1s2 RRR    ABD:  soft +BS nt nd     EXT: no edema, moves all extremities     SKIN: no cyanosis     Neuro: alert, oriented, follows commands and answers simple questions.          LABS:                        14.3   16.8  )-----------( 200      ( 29 Mar 2018 12:53 )             42.3     03-29    142  |  109<H>  |  22  ----------------------------<  123<H>  4.0   |  25  |  1.40<H>    Ca    7.9<L>      29 Mar 2018 12:53            CAPILLARY BLOOD GLUCOSE              CULTURES:      Medications:  ceFAZolin   IVPB 2000 milliGRAM(s) IV Intermittent every 8 hours  ceFAZolin   IVPB 2000 milliGRAM(s) IV Intermittent every 8 hours        HYDROmorphone  Injectable 0.5 milliGRAM(s) IV Push every 10 minutes PRN  oxyCODONE    IR 5 milliGRAM(s) Oral every 4 hours PRN  oxyCODONE    IR 10 milliGRAM(s) Oral every 6 hours PRN  promethazine IVPB 3.125 milliGRAM(s) IV Intermittent once PRN            dexamethasone  IVPB 10 milliGRAM(s) IV Intermittent every 8 hours    lactated ringers. 1000 milliLiter(s) IV Continuous <Continuous>            RADIOLOGY/IMAGING/ECHO    Critical care point of care ultrasound:    Assessment/Plan:   77y old  Male w/ spinal stenosis, HTN, BPH, +ETOH-drinks 2-3 beers/day who presents post op s/p L2-S1 laminectomies and spinal fusion.    NS-supportive care, pain control, neurovascular checks, repeat labs, transfuse to keep Hgb>8.5 as per NS team, cont Decadron 10mg q8X3 days  CV-BP stable, keep MAP>80, cont amlodipine   Pulm-stable on nc   GI-advance diet as tolerated   Renal-Cr stable, adequate UO, repeat stat labs, cont LR 100cc/hr  Urology-hx BPH, traumatic mckinney insertion, makenna hematuria, Urology consult called, monitor UO  ID-emperic Cefazolin  ICU-DVT prophylaxis, int sequential stockings, hold chemical AC    Discuss w/ Dr Luna and agrees with plan         Critical Care time: 35 min   (Reviewing data, imaging, discussing with multidisciplinary team, non inclusive of procedures, discussing goals of care with patient/family) Patient is a 77y old  Male w/ spinal stenosis, HTN, BPH, +ETOH-drinks 2-3 beers/day who presents post op s/p L2-S1 laminectomies and spinal fusion.  EBL 1200cc, 500cc back by cellsaver.  Pt alert and responsive. RN reports he was not moving his Rt lower extremity but now moving.   pt c/o back pain.      PAST MEDICAL & SURGICAL HISTORY:  Skin cancer  Sciatica  HTN (hypertension)  Numbness  Back pain  Arthritis  [place] (hard of hearing): right hearing aid  Spinal stenosis in cervical region  BPH (benign prostatic hypertrophy)  Elective surgery: Mohs  neck 2018  Elective surgery: cataract extraction 2018  Elective surgery: cervical fusion 2013  H/O left knee surgery: 1994 - repair of torn meniscus  Inguinal hernia: h/o repair - right      BRIEF HOSPITAL COURSE:    Review of Systems:    Neg dyspnea, cough, or HU  Neg chestpain, palpitations, or PND  +sciatica  +numness to upper extremities                                                  All other ROS are negative.    ICU Vital Signs Last 24 Hrs  T(C): 36.7 (29 Mar 2018 11:34), Max: 36.7 (29 Mar 2018 11:34)  T(F): 98.1 (29 Mar 2018 11:34), Max: 98.1 (29 Mar 2018 11:34)  HR: 76 (29 Mar 2018 12:19) (68 - 78)  BP: 98/56 (29 Mar 2018 12:19) (88/46 - 140/80)  BP(mean): --  ABP: --  ABP(mean): --  RR: 16 (29 Mar 2018 12:19) (16 - 20)  SpO2: 93% (29 Mar 2018 12:19) (93% - 97%)    Physical Examination:    General: well developed, non-toxic appearance     HEENT: normacephalic pupils equal     PULM: CTA b/l no wheezes     CVS: s1s2 RRR    ABD:  soft +BS nt nd     EXT: no edema, moves all extremities     SKIN: no cyanosis     Neuro: alert, oriented, follows commands and answers simple questions.          LABS:                        14.3   16.8  )-----------( 200      ( 29 Mar 2018 12:53 )             42.3     03-29    142  |  109<H>  |  22  ----------------------------<  123<H>  4.0   |  25  |  1.40<H>    Ca    7.9<L>      29 Mar 2018 12:53            CAPILLARY BLOOD GLUCOSE              CULTURES:      Medications:  ceFAZolin   IVPB 2000 milliGRAM(s) IV Intermittent every 8 hours  ceFAZolin   IVPB 2000 milliGRAM(s) IV Intermittent every 8 hours        HYDROmorphone  Injectable 0.5 milliGRAM(s) IV Push every 10 minutes PRN  oxyCODONE    IR 5 milliGRAM(s) Oral every 4 hours PRN  oxyCODONE    IR 10 milliGRAM(s) Oral every 6 hours PRN  promethazine IVPB 3.125 milliGRAM(s) IV Intermittent once PRN            dexamethasone  IVPB 10 milliGRAM(s) IV Intermittent every 8 hours    lactated ringers. 1000 milliLiter(s) IV Continuous <Continuous>            RADIOLOGY/IMAGING/ECHO    Critical care point of care ultrasound:    Assessment/Plan:   77y old  Male w/ spinal stenosis, HTN, BPH, +ETOH-drinks 2-3 beers/day who presents post op s/p L2-S1 laminectomies and spinal fusion.    NS-supportive care, pain control, neurovascular checks, repeat labs, transfuse to keep Hgb>8.5 as per NS team, cont Decadron 10mg q8X3 days  CV-BP stable, keep MAP>80, cont amlodipine   Pulm-stable on nc   GI-advance diet as tolerated   Renal-Cr stable, adequate UO, repeat stat labs, cont LR 100cc/hr  Urology-hx BPH, traumatic mckinney insertion, makenna hematuria, Urology consult called, monitor UO  ID-emperic Cefazolin  Psych-hx etoh use, monitor for withdrawal, will place on thiamine/folate/mvi  ICU-DVT prophylaxis, int sequential stockings, hold chemical AC    Discuss w/ Dr Luna and agrees with plan         Critical Care time: 35 min   (Reviewing data, imaging, discussing with multidisciplinary team, non inclusive of procedures, discussing goals of care with patient/family)

## 2018-03-29 NOTE — DISCHARGE NOTE ADULT - MEDICATION SUMMARY - MEDICATIONS TO TAKE
I will START or STAY ON the medications listed below when I get home from the hospital:    LSO Brace  -- LSO Brace  ICD 10: M48.07  JENNIE 99  -- Indication: For While ambulating    finasteride 5 mg oral tablet  -- 1 tab(s) by mouth once a day  -- Indication: For Home med per PMD    Percocet 5/325 oral tablet  -- 1 tab(s) by mouth every 4 hours, As Needed for pain MDD:6  -- Caution federal law prohibits the transfer of this drug to any person other  than the person for whom it was prescribed.  May cause drowsiness.  Alcohol may intensify this effect.  Use care when operating dangerous machinery.  This prescription cannot be refilled.  This product contains acetaminophen.  Do not use  with any other product containing acetaminophen to prevent possible liver damage.  Using more of this medication than prescribed may cause serious breathing problems.    -- Indication: For Pain    tamsulosin 0.4 mg oral capsule  -- 1 cap(s) by mouth once a day  -- Indication: For Home med per PMD    Zofran 4 mg oral tablet  -- 1 tab(s) by mouth every 6 hours, As Needed for nausea MDD:4  -- Indication: For Nausea    METOPROLOL SUCC ER 25 MG TAB  -- Indication: For Home med per PMD    amLODIPine 2.5 mg oral tablet  -- 1 tab(s) by mouth once a day  -- Indication: For Home med per PMD    Colace 100 mg oral capsule  -- 1 cap(s) by mouth 3 times a day, As Needed fo constipation  -- Medication should be taken with plenty of water.    -- Indication: For Constipation    cyclobenzaprine 10 mg oral tablet  -- 1 tab(s) by mouth 3 times a day, As Needed -for muscle spasm MDD:3  -- May cause drowsiness.  Alcohol may intensify this effect.  Use care when operating dangerous machinery.  Obtain medical advice before taking any non-prescription drugs as some may affect the action of this medication.    -- Indication: For Muscles spasms I will START or STAY ON the medications listed below when I get home from the hospital:    LSO Brace  -- LSO Brace  ICD 10: M48.07  JENNIE 99  -- Indication: For While ambulating    finasteride 5 mg oral tablet  -- 1 tab(s) by mouth once a day  -- Indication: For Home med per PMD    Percocet 5/325 oral tablet  -- 1 tab(s) by mouth every 4 hours, As Needed for pain MDD:6  -- Caution federal law prohibits the transfer of this drug to any person other  than the person for whom it was prescribed.  May cause drowsiness.  Alcohol may intensify this effect.  Use care when operating dangerous machinery.  This prescription cannot be refilled.  This product contains acetaminophen.  Do not use  with any other product containing acetaminophen to prevent possible liver damage.  Using more of this medication than prescribed may cause serious breathing problems.    -- Indication: For Pain    oxyCODONE 5 mg oral tablet  -- 1 tab(s) by mouth every 4 hours, As needed, Mild Pain (1 - 3)  -- Indication: For Pain    tamsulosin 0.4 mg oral capsule  -- 1 cap(s) by mouth once a day  -- Indication: For Home med per PMD    Zofran 4 mg oral tablet  -- 1 tab(s) by mouth every 6 hours, As Needed for nausea MDD:4  -- Indication: For Nausea    METOPROLOL SUCC ER 25 MG TAB  -- Indication: For Home med per PMD    amLODIPine 2.5 mg oral tablet  -- 1 tab(s) by mouth once a day  -- Indication: For Home med per PMD    Keflex 250 mg oral capsule  -- 1 cap(s) by mouth 4 times a day while lumbar drains are present  -- Indication: For For drain infx ppx    Colace 100 mg oral capsule  -- 1 cap(s) by mouth 3 times a day, As Needed fo constipation  -- Medication should be taken with plenty of water.    -- Indication: For Constipation    cyclobenzaprine 10 mg oral tablet  -- 1 tab(s) by mouth 3 times a day, As Needed -for muscle spasm MDD:3  -- May cause drowsiness.  Alcohol may intensify this effect.  Use care when operating dangerous machinery.  Obtain medical advice before taking any non-prescription drugs as some may affect the action of this medication.    -- Indication: For Muscles spasms

## 2018-03-29 NOTE — CONSULT NOTE ADULT - PROBLEM SELECTOR RECOMMENDATION 3
- likely traumatic  - f/u UA/ urine cx  - monitor I&O's - likely traumatic  - f/u UA/ urine cx  - monitor I&O's and H&H

## 2018-03-29 NOTE — DISCHARGE NOTE ADULT - CARE PLAN
Principal Discharge DX:	Lumbar spinal stenosis  Goal:	Return to baseline ADLs s/p L2-S1 Laminectomy and fusion  Assessment and plan of treatment:	1. Pain Control  2. Walking with full weight bearing as tolerated, with assitive devices (walke/cane) as needed with LSO brace  3. PT as needed  4. Follow up with Dr. Orr as Outpatient in 7-10 days after discharge from the hospital or rehab. Call office for appointment.  5. Keep dressing clean and dry.   6. No bath/hot tubs or soaks  7. Drain management per Plastic surgery, Antibiotics while drains in place.

## 2018-03-29 NOTE — DISCHARGE NOTE ADULT - CARE PROVIDER_API CALL
Suraj Orr), Orthopaedic Surgery  [address] [address]  Phone: [phone]  Fax: [phone]    Tommie Stokes), Plastic Surgery  [address] [place] [id number]  Phone: [phone]  Fax: [phone]

## 2018-03-29 NOTE — PROGRESS NOTE ADULT - ASSESSMENT
A/P: 77M s/p L2-S1 Laminectomy and posterior spinal fusion POD 0  Pain Control  Decadron 10mg IV x3 doses over 24 hours  DVT ppx with SCDs only, no chemical AC  WBAT  PT/OT  Monitor Higgins output  Monitor Drain output, management per plastic surgery  FU Urology consult recommendations  Incentive Spirometry  Medical consult appreciated  DC planning A/P: 77M s/p L2-S1 Laminectomy and posterior spinal fusion POD 0  Pain Control  Decadron 10mg IV x3 doses over 24 hours  DVT ppx with SCDs only, no chemical AC  WBAT  PT/OT  Monitor Higgins output  Monitor Drain output, management per plastic surgery  Ancef while drains are in  FU Urology consult, Dr. Springer's recommendations  Incentive Spirometry  Medical consult appreciated  DC planning

## 2018-03-29 NOTE — CONSULT NOTE ADULT - PROBLEM SELECTOR RECOMMENDATION 2
- stable.  - ok to restart amlodipine and metoprolol  - monitor routine hemodynamics  - management per ICU team - stable.  - ok to restart amlodipine and metoprolol  - monitor routine hemodynamics

## 2018-03-29 NOTE — DISCHARGE NOTE ADULT - PLAN OF CARE
Return to baseline ADLs s/p L2-S1 Laminectomy and fusion 1. Pain Control  2. Walking with full weight bearing as tolerated, with assitive devices (walke/cane) as needed with LSO brace  3. PT as needed  4. Follow up with Dr. Orr as Outpatient in 7-10 days after discharge from the hospital or rehab. Call office for appointment.  5. Keep dressing clean and dry.   6. No bath/hot tubs or soaks  7. Drain management per Plastic surgery, Antibiotics while drains in place.

## 2018-03-29 NOTE — CONSULT NOTE ADULT - PROBLEM SELECTOR RECOMMENDATION 9
- s/p L2-S1 Laminectomy and Fusion  - currently pain controlled: continue per ICU: oxycodone/hydromorphone PRN: diazepam for muscle spasms  - decadron 10mg x3 over 24hrs  - ANCEF for postop antibiotic   - acute blood loss: 1200cc: 500cc returned via cellsaver: monitor H&H keep Hbg >8.0  - post operative management per ortho and ICU teams - s/p L2-S1 Laminectomy and Fusion  - currently pain controlled: oxycodone/hydromorphone PRN: diazepam for muscle spasms  - decadron 10mg x3 over 24hrs  - ANCEF for postop antibiotic   - acute blood loss: 1200cc: 500cc returned via cellsaver: monitor H&H keep Hbg >8.0  - post operative management discussed with ICU team

## 2018-03-30 DIAGNOSIS — N40.1 BENIGN PROSTATIC HYPERPLASIA WITH LOWER URINARY TRACT SYMPTOMS: ICD-10-CM

## 2018-03-30 DIAGNOSIS — R31.0 GROSS HEMATURIA: ICD-10-CM

## 2018-03-30 LAB
ANION GAP SERPL CALC-SCNC: 9 MMOL/L — SIGNIFICANT CHANGE UP (ref 5–17)
BASOPHILS # BLD AUTO: 0 K/UL — SIGNIFICANT CHANGE UP (ref 0–0.2)
BASOPHILS NFR BLD AUTO: 0.2 % — SIGNIFICANT CHANGE UP (ref 0–2)
BUN SERPL-MCNC: 24 MG/DL — HIGH (ref 7–23)
CALCIUM SERPL-MCNC: 7.7 MG/DL — LOW (ref 8.5–10.1)
CHLORIDE SERPL-SCNC: 105 MMOL/L — SIGNIFICANT CHANGE UP (ref 96–108)
CO2 SERPL-SCNC: 24 MMOL/L — SIGNIFICANT CHANGE UP (ref 22–31)
CREAT SERPL-MCNC: 1.4 MG/DL — HIGH (ref 0.5–1.3)
CULTURE RESULTS: NO GROWTH — SIGNIFICANT CHANGE UP
EOSINOPHIL # BLD AUTO: 0 K/UL — SIGNIFICANT CHANGE UP (ref 0–0.5)
EOSINOPHIL NFR BLD AUTO: 0 % — SIGNIFICANT CHANGE UP (ref 0–6)
GLUCOSE SERPL-MCNC: 139 MG/DL — HIGH (ref 70–99)
HCT VFR BLD CALC: 36.3 % — LOW (ref 39–50)
HGB BLD-MCNC: 12.3 G/DL — LOW (ref 13–17)
LYMPHOCYTES # BLD AUTO: 0.5 K/UL — LOW (ref 1–3.3)
LYMPHOCYTES # BLD AUTO: 3.5 % — LOW (ref 13–44)
MAGNESIUM SERPL-MCNC: 1.8 MG/DL — SIGNIFICANT CHANGE UP (ref 1.6–2.6)
MCHC RBC-ENTMCNC: 31.3 PG — SIGNIFICANT CHANGE UP (ref 27–34)
MCHC RBC-ENTMCNC: 33.8 GM/DL — SIGNIFICANT CHANGE UP (ref 32–36)
MCV RBC AUTO: 92.8 FL — SIGNIFICANT CHANGE UP (ref 80–100)
MONOCYTES # BLD AUTO: 1.1 K/UL — HIGH (ref 0–0.9)
MONOCYTES NFR BLD AUTO: 7.7 % — SIGNIFICANT CHANGE UP (ref 1–9)
NEUTROPHILS # BLD AUTO: 12.5 K/UL — HIGH (ref 1.8–7.4)
NEUTROPHILS NFR BLD AUTO: 88.6 % — HIGH (ref 43–77)
PHOSPHATE SERPL-MCNC: 3.2 MG/DL — SIGNIFICANT CHANGE UP (ref 2.5–4.5)
PLATELET # BLD AUTO: 204 K/UL — SIGNIFICANT CHANGE UP (ref 150–400)
POTASSIUM SERPL-MCNC: 4.5 MMOL/L — SIGNIFICANT CHANGE UP (ref 3.5–5.3)
POTASSIUM SERPL-SCNC: 4.5 MMOL/L — SIGNIFICANT CHANGE UP (ref 3.5–5.3)
RBC # BLD: 3.91 M/UL — LOW (ref 4.2–5.8)
RBC # FLD: 11.4 % — SIGNIFICANT CHANGE UP (ref 10.3–14.5)
SODIUM SERPL-SCNC: 138 MMOL/L — SIGNIFICANT CHANGE UP (ref 135–145)
SPECIMEN SOURCE: SIGNIFICANT CHANGE UP
WBC # BLD: 14.1 K/UL — HIGH (ref 3.8–10.5)
WBC # FLD AUTO: 14.1 K/UL — HIGH (ref 3.8–10.5)

## 2018-03-30 PROCEDURE — 99233 SBSQ HOSP IP/OBS HIGH 50: CPT | Mod: GC

## 2018-03-30 RX ORDER — AMLODIPINE BESYLATE 2.5 MG/1
2.5 TABLET ORAL DAILY
Qty: 0 | Refills: 0 | Status: DISCONTINUED | OUTPATIENT
Start: 2018-03-30 | End: 2018-04-03

## 2018-03-30 RX ORDER — MAGNESIUM SULFATE 500 MG/ML
2 VIAL (ML) INJECTION ONCE
Qty: 0 | Refills: 0 | Status: COMPLETED | OUTPATIENT
Start: 2018-03-30 | End: 2018-03-30

## 2018-03-30 RX ADMIN — TAMSULOSIN HYDROCHLORIDE 0.4 MILLIGRAM(S): 0.4 CAPSULE ORAL at 12:05

## 2018-03-30 RX ADMIN — OXYCODONE HYDROCHLORIDE 10 MILLIGRAM(S): 5 TABLET ORAL at 21:00

## 2018-03-30 RX ADMIN — Medication 100 MILLIGRAM(S): at 00:03

## 2018-03-30 RX ADMIN — Medication 1 TABLET(S): at 14:33

## 2018-03-30 RX ADMIN — Medication 102 MILLIGRAM(S): at 05:37

## 2018-03-30 RX ADMIN — HYDROMORPHONE HYDROCHLORIDE 1 MILLIGRAM(S): 2 INJECTION INTRAMUSCULAR; INTRAVENOUS; SUBCUTANEOUS at 01:41

## 2018-03-30 RX ADMIN — Medication 100 MILLIGRAM(S): at 05:38

## 2018-03-30 RX ADMIN — HYDROMORPHONE HYDROCHLORIDE 1 MILLIGRAM(S): 2 INJECTION INTRAMUSCULAR; INTRAVENOUS; SUBCUTANEOUS at 12:03

## 2018-03-30 RX ADMIN — Medication 100 MILLIGRAM(S): at 14:33

## 2018-03-30 RX ADMIN — Medication 1 TABLET(S): at 21:00

## 2018-03-30 RX ADMIN — Medication 100 MILLIGRAM(S): at 09:06

## 2018-03-30 RX ADMIN — HYDROMORPHONE HYDROCHLORIDE 1 MILLIGRAM(S): 2 INJECTION INTRAMUSCULAR; INTRAVENOUS; SUBCUTANEOUS at 06:05

## 2018-03-30 RX ADMIN — MAGNESIUM HYDROXIDE 30 MILLILITER(S): 400 TABLET, CHEWABLE ORAL at 22:37

## 2018-03-30 RX ADMIN — Medication 50 GRAM(S): at 10:26

## 2018-03-30 RX ADMIN — Medication 100 MILLIGRAM(S): at 17:32

## 2018-03-30 RX ADMIN — Medication 1 TABLET(S): at 05:38

## 2018-03-30 RX ADMIN — FINASTERIDE 5 MILLIGRAM(S): 5 TABLET, FILM COATED ORAL at 12:05

## 2018-03-30 RX ADMIN — HYDROMORPHONE HYDROCHLORIDE 1 MILLIGRAM(S): 2 INJECTION INTRAMUSCULAR; INTRAVENOUS; SUBCUTANEOUS at 05:49

## 2018-03-30 RX ADMIN — Medication 100 MILLIGRAM(S): at 21:00

## 2018-03-30 RX ADMIN — HYDROMORPHONE HYDROCHLORIDE 1 MILLIGRAM(S): 2 INJECTION INTRAMUSCULAR; INTRAVENOUS; SUBCUTANEOUS at 13:30

## 2018-03-30 RX ADMIN — OXYCODONE HYDROCHLORIDE 10 MILLIGRAM(S): 5 TABLET ORAL at 22:00

## 2018-03-30 RX ADMIN — AMLODIPINE BESYLATE 2.5 MILLIGRAM(S): 2.5 TABLET ORAL at 17:32

## 2018-03-30 RX ADMIN — HYDROMORPHONE HYDROCHLORIDE 1 MILLIGRAM(S): 2 INJECTION INTRAMUSCULAR; INTRAVENOUS; SUBCUTANEOUS at 01:56

## 2018-03-30 NOTE — PROGRESS NOTE ADULT - SUBJECTIVE AND OBJECTIVE BOX
Pt seen and examined  NAD    lumbar suture line is CDI   no erythema, no palpable collections/hematomas  HV1 & JP1 intact and appropriate    A/P: Pt is stable. Dressing changed  cont to monitor drain output  cont dressing changes  abx while drains in place  Will follow

## 2018-03-30 NOTE — CONSULT NOTE ADULT - PROBLEM SELECTOR RECOMMENDATION 2
urology consult apprecieated. hematuri most likely secondary t o traumatic mckinney inertion.  will follow in the next 24h

## 2018-03-30 NOTE — PROGRESS NOTE ADULT - SUBJECTIVE AND OBJECTIVE BOX
77M with extensive PMHx including htn, chronic back pain, spinal stenosis, bph, s/p left knee meniscus repair, s/p inguinal hernia repair, ETOH-drinks 2-3 beers/day, admitted to ICU s/p L2-S1 laminectomies and spinal fusion POD 1.  EBL 1200cc, 500cc back by dayne.       PAST MEDICAL & SURGICAL HISTORY:  Skin cancer  Sciatica  HTN (hypertension)  Numbness  Back pain  Arthritis  [place] (hard of hearing): right hearing aid  Spinal stenosis in cervical region  BPH (benign prostatic hypertrophy)  Elective surgery: Mohs  neck   Elective surgery: cataract extraction   Elective surgery: cervical fusion   H/O left knee surgery:  - repair of torn meniscus  Inguinal hernia: h/o repair - right      Review of Systems:  Constitutional: No fever, chills, fatigue  Neuro: No headache, +numbness, +sciatica, [place]  Resp: No cough, wheezing, shortness of breath  CVS: No chest pain, palpitations, leg swelling  GI: No abdominal pain, nausea, vomiting, diarrhea   : No dysuria, frequency, incontinence  Skin: No itching, burning, rashes, or lesions   Msk: No joint pain or swelling  Psych: No depression, anxiety, mood swings    T(F): 98.4 (18 @ 00:38), Max: 98.4 (18 @ 00:38)  HR: 62 (18 @ 02:00) (62 - 91)  BP: 116/64 (18 @ 02:00) (88/46 - 171/99)  RR: 16 (18 @ 02:00) (10 - 22)  SpO2: 92% (18 @ 02:00) (92% - 100%)  Wt(kg): --        CAPILLARY BLOOD GLUCOSE          I&O's Summary    29 Mar 2018 07:01  -  30 Mar 2018 03:43  --------------------------------------------------------  IN: 2345 mL / OUT: 1910 mL / NET: 435 mL        Physical Exam:     Gen: well nourished  Neuro: A&Ox3, non-focal  HEENT: NC/AT  Resp: CTA b/l  CVS: nl S1/S2, RRR  Abd: soft, nt, nd, +bs  Ext: no edema, +pulses  Skin: well perfused, warm    Meds:    ceFAZolin   IVPB IV Intermittent  tamsulosin Oral  dexamethasone  IVPB IV Intermittent  finasteride Oral  acetaminophen   Tablet Oral PRN  acetaminophen   Tablet. Oral PRN  diazepam    Tablet Oral PRN  HYDROmorphone  Injectable IV Push PRN  ondansetron Injectable IV Push PRN  oxyCODONE    IR Oral PRN  oxyCODONE    IR Oral PRN  aluminum hydroxide/magnesium hydroxide/simethicone Suspension Oral PRN  docusate sodium Oral  famotidine    Tablet Oral PRN  magnesium hydroxide Suspension Oral PRN  calcium carbonate 1250 mG + Vitamin D (OsCal 500 + D) Oral  folic acid Oral  lactated ringers. IV Continuous  multivitamin Oral  thiamine Oral  benzocaine 15 mG/menthol 3.6 mG Lozenge Oral PRN                              13.6   16.8   )-----------( 200    ( 29 Mar 2018 17:24 )             39.8           141  |  110<H>  |  21  ----------------------------<  153<H>  4.2   |  23  |  1.30    Ca    7.9<L>      29 Mar 2018 17:24  Phos  3.6       Mg     1.8                   Urinalysis Basic - ( 29 Mar 2018 17:52 )    Color: Red / Appearance: Turbid / S.015 / pH: x  Gluc: x / Ketone: Small  / Bili: Negative / Urobili: Negative   Blood: x / Protein: 75 mg/dL / Nitrite: Negative   Leuk Esterase: Trace / RBC: >50 /HPF / WBC 6-10   Sq Epi: x / Non Sq Epi: Occasional / Bacteria: Few              CENTRAL LINE: no    DONALDSON: Yes    A-LINE: Yes    GLOBAL ISSUE/BEST PRACTICE:  Analgesia: yes  Sedation: no  HOB elevation: yes  Stress ulcer prophylaxis: yes  VTE prophylaxis: scd  Glycemic control: yes  Nutrition: yes      CODE STATUS: Full  GOC discussion: Y  Critical care time spent (mins): 38  (Reviewing data, imaging, discussing with multidisciplinary team, non inclusive of procedures, discussing goals of care with patient/family)

## 2018-03-30 NOTE — CONSULT NOTE ADULT - ASSESSMENT
76 yo male h/o BPH s/p laminectomy w/ resolving hematuria post traumatic mckinney placement.
Patient is a 77y old  Male who presents with a chief complaint of L2-S1 Laminectomy and Fusion (29 Mar 2018 13:31), who had estimated blood loss of 1200cc intraoperatively, was admitted to ICU for close observation.

## 2018-03-30 NOTE — PROGRESS NOTE ADULT - SUBJECTIVE AND OBJECTIVE BOX
Pt S/E at bedside, no acute events overnight, pain controlled    Vital Signs Last 24 Hrs  T(C): 36.9 (30 Mar 2018 04:01), Max: 36.9 (30 Mar 2018 00:38)  T(F): 98.4 (30 Mar 2018 04:01), Max: 98.4 (30 Mar 2018 00:38)  HR: 59 (30 Mar 2018 05:00) (59 - 91)  BP: 105/58 (30 Mar 2018 05:00) (88/46 - 171/99)  BP(mean): 76 (30 Mar 2018 05:00) (76 - 109)  RR: 19 (30 Mar 2018 05:00) (10 - 22)  SpO2: 95% (30 Mar 2018 05:00) (93% - 100%)    Gen: NAD,     Spine:  Dressing clean dry intact  +HMV  +HEATHER  +EHL/FHL/TA/GS  +AIN/PIN/M/R/U/Msc/Ax  SILT L3-S1  SILT C5-T1  +DP/PT Pulses  +Radial Pulse  Compartments soft  No calf TTP B/L Pt S/E at bedside, no acute events overnight, pain controlled    Vital Signs Last 24 Hrs  T(C): 36.9 (30 Mar 2018 04:01), Max: 36.9 (30 Mar 2018 00:38)  T(F): 98.4 (30 Mar 2018 04:01), Max: 98.4 (30 Mar 2018 00:38)  HR: 59 (30 Mar 2018 05:00) (59 - 91)  BP: 105/58 (30 Mar 2018 05:00) (88/46 - 171/99)  BP(mean): 76 (30 Mar 2018 05:00) (76 - 109)  RR: 19 (30 Mar 2018 05:00) (10 - 22)  SpO2: 95% (30 Mar 2018 05:00) (93% - 100%)    Gen: NAD,     Spine:  Dressing clean dry intact  +HMV  +HEATHER  +EHL/FHL/TA/GS  +AIN/PIN/M/R/U/Msc/Ax  SILT L3-S1  SILT C5-T1  +DP/PT Pulses  +Radial Pulse  Compartments soft  No calf TTP B/L      patient seen and examine , agree with above.  transfer to the floor today.

## 2018-03-30 NOTE — CONSULT NOTE ADULT - SUBJECTIVE AND OBJECTIVE BOX
CHIEF COMPLAINT: Hematuria post intraop mckinney placement    HPI:  76 yo M w/ h/o BPH s/p L2-S1 laminectomy   instrumented fusion   autograft  allograft  plastic closure utilizing fluoroscopy - cellsaver  c/o back pain and numbness to bilat feet X 1 year (16 Mar 2018 14:10)  Hematuria noted post mckinney placement; urine clear overnight/this morning. Urine output adeq. Pt takes flomax/proscar at home. Has seen Dr Dorado in past.       PAST MEDICAL & SURGICAL HISTORY:  Skin cancer  Sciatica  HTN (hypertension)  Numbness  Back pain  Arthritis  [place] (hard of hearing): right hearing aid  Spinal stenosis in cervical region  BPH (benign prostatic hypertrophy)  Elective surgery: Mohs  neck   Elective surgery: cataract extraction   Elective surgery: cervical fusion   H/O left knee surgery:  - repair of torn meniscus  Inguinal hernia: h/o repair - right      REVIEW OF SYSTEMS:    CONSTITUTIONAL: No fevers or chills  EYES/ENT: No visual changes;  No vertigo or throat pain   NECK: No pain or stiffness  RESPIRATORY: No cough, wheezing, hemoptysis; No shortness of breath  CARDIOVASCULAR: No chest pain or palpitations  GASTROINTESTINAL: No abdominal or epigastric pain. No nausea, vomiting, or hematemesis; No diarrhea or constipation. No melena or hematochezia.  GENITOURINARY: No dysuria  NEUROLOGICAL: No numbness  SKIN: No itching, burning, rashes, or lesions   All other review of systems is negative unless indicated above.    MEDICATIONS  (STANDING):  calcium carbonate 1250 mG + Vitamin D (OsCal 500 + D) 1 Tablet(s) Oral three times a day  ceFAZolin   IVPB 2000 milliGRAM(s) IV Intermittent every 8 hours  docusate sodium 100 milliGRAM(s) Oral three times a day  finasteride 5 milliGRAM(s) Oral daily  folic acid 1 milliGRAM(s) Oral daily  multivitamin 1 Tablet(s) Oral daily  tamsulosin 0.4 milliGRAM(s) Oral daily  thiamine 100 milliGRAM(s) Oral daily    MEDICATIONS  (PRN):  acetaminophen   Tablet 650 milliGRAM(s) Oral every 6 hours PRN For Temp greater than 38 C (100.4 F)  acetaminophen   Tablet. 650 milliGRAM(s) Oral every 6 hours PRN Headache  aluminum hydroxide/magnesium hydroxide/simethicone Suspension 30 milliLiter(s) Oral every 12 hours PRN Indigestion  benzocaine 15 mG/menthol 3.6 mG Lozenge 1 Lozenge Oral every 2 hours PRN Sore Throat  diazepam    Tablet 5 milliGRAM(s) Oral every 8 hours PRN Muscle Spasms  famotidine    Tablet 20 milliGRAM(s) Oral every 24 hours PRN Dyspepsia  HYDROmorphone  Injectable 1 milliGRAM(s) IV Push every 4 hours PRN Severe Pain (7 - 10)  magnesium hydroxide Suspension 30 milliLiter(s) Oral every 12 hours PRN Constipation  ondansetron Injectable 4 milliGRAM(s) IV Push every 6 hours PRN Nausea  oxyCODONE    IR 5 milliGRAM(s) Oral every 4 hours PRN Mild Pain (1 - 3)  oxyCODONE    IR 10 milliGRAM(s) Oral every 4 hours PRN Moderate Pain (4 - 6)      Allergies    No Known Allergies    Intolerances        Social History:  Alcohol: Denied  Smoking: Nonsmoker  Drug Use: Denied  Marital Status:     FAMILY HISTORY:      Vital Signs Last 24 Hrs  T(C): 36.9 (30 Mar 2018 04:01), Max: 36.9 (30 Mar 2018 00:38)  T(F): 98.4 (30 Mar 2018 04:01), Max: 98.4 (30 Mar 2018 00:38)  HR: 54 (30 Mar 2018 07:00) (54 - 91)  BP: 106/59 (30 Mar 2018 07:00) (88/46 - 171/99)  BP(mean): 76 (30 Mar 2018 07:00) (76 - 109)  RR: 18 (30 Mar 2018 07:00) (10 - 23)  SpO2: 95% (30 Mar 2018 07:00) (93% - 100%)    PHYSICAL EXAM:    Constitutional: NAD, well-developed  HEENT: NETO, EOMI, Normal Hearing  Neck: No LAD  Back: No CVA tenderness  Respiratory: CTAB   Cardiovascular: S1 and S2, RRR  Abd: BS+, soft, NT/ND, No CVAT  : Normal phallus,open meatus,bilateral descended testes, no masses  mckinney - clear yellow urine  IVY: unable  Extremities: No peripheral edema  Vascular: 2+ peripheral pulses  Neurological: A/O x 3, no focal deficits  Psychiatric: Normal mood, normal affect  Musculoskeletal: 5/5 strength b/l upper and lower extremities  Skin: No rashes    LABS:                        12.3   14.1  )-----------( 204      ( 30 Mar 2018 06:08 )             36.3         138  |  105  |  24<H>  ----------------------------<  139<H>  4.5   |  24  |  1.40<H>    Ca    7.7<L>      30 Mar 2018 06:08  Phos  3.2       Mg     1.8             Urinalysis Basic - ( 29 Mar 2018 17:52 )    Color: Red / Appearance: Turbid / S.015 / pH: x  Gluc: x / Ketone: Small  / Bili: Negative / Urobili: Negative   Blood: x / Protein: 75 mg/dL / Nitrite: Negative   Leuk Esterase: Trace / RBC: >50 /HPF / WBC 6-10   Sq Epi: x / Non Sq Epi: Occasional / Bacteria: Few      Urine Culture:   Blood Cultures      RADIOLOGY & ADDITIONAL STUDIES:

## 2018-03-30 NOTE — PROGRESS NOTE ADULT - ASSESSMENT
A/P: 77M s/p L2-S1 Laminectomy and posterior spinal fusion POD 1  Pain Control  Complete Decadron 10mg IV x3 doses over 24 hours today  DVT ppx with SCDs only, no chemical AC  WBAT  PT/OT  Monitor Higgins output  Monitor Drain output, management per plastic surgery  Ancef while drains are in  FU Urology consult, Dr. Springer's recommendations  Incentive Spirometry  Medical consult appreciated  DC planning

## 2018-03-30 NOTE — PROGRESS NOTE ADULT - ASSESSMENT
76 y/o M w/ PMH of spinal stenosis, chronic back pain, HTN, BPH, EtOH use (2-3 beers/day) who underwent laminectomy and posterior spinal fusion of L2-S1. The procedure was uncomplicated and the pt was brought to the ICU for monitoring only.     Neuro-Pain control w/ oxycodone and acetominophen. Diazepam PRN for muscle spasm. C/w neurochecks  Cardio-HTN, restart home meds?? HD stable, no issues at this time.  Resp-Stable, no issues at this time  GI-Tolerating diet, continue. C/w bowel regimen and pepcid. No issues at this time  Renal-Hematuria, resolved. Uro recommending to keep mckinney in place at least 48 hours or until pt is able to ambulate.   Endo-Stable no issues,  ID-Ancef to continue until drains are removed. Leukocytosis likely reactive to procedure  Heme-H/H stable, will monitor  PPX-SCDs 76 y/o M w/ PMH of spinal stenosis, chronic back pain, HTN, BPH, EtOH use (2-3 beers/day) who underwent laminectomy and posterior spinal fusion of L2-S1. The procedure was uncomplicated and the pt was brought to the ICU for monitoring only.     Neuro-Pain control PRN w/ oxycontin, dilaudid and acetominophen. Diazepam PRN for muscle spasm. C/w neurochecks  Cardio-HTN, will restart home dose of amlodipine. HD stable, no issues at this time  Resp-Stable, no issues at this time  GI-Tolerating diet, continue. C/w bowel regimen. Will DC multivitamin, folic acid, thiamine, and pepcid. No issues at this time  Renal-Hematuria, resolved. Uro recommending to keep mckinney in place at least 48 hours or until pt is able to ambulate. Hypomagnesemia, repleted.  Endo-Stable no issues  ID-Ancef to continue until drains are removed. Leukocytosis likely reactive to procedure  Heme-H/H stable, will monitor  PPX-SCDs    PT stable for transfer to post-op surgical floor.

## 2018-03-30 NOTE — CONSULT NOTE ADULT - ATTENDING COMMENTS
Patient seen and evaluated independently from above.     This is a 77-year-old male with a history of HTN, BPH, mild CKD, cervical stenosis s/p fusion, EtOH use, and lumbar spinal stenosis who presents for elective L2-S1 laminectomy with spinal fusion. OR course complicated by 1200mL EBL, 500mL returned by cellsaver. Also with traumatic mckinney and hematuria.    - Admit to ICU  - Monitor H/H, goal HGB>8-8.5, monitor for bleeding in HEATHER and hemovac  - Dexamethasone 10 IV q8h for 3 doses  - Pain control with oxycodone/hydromorphone prn, diazepam prn muscle spasms  - HD stable, restart amlodipine and metoprolol, d/c LR when tolerating po  - Incentive spirometry, supp O2 prn  - Regular diet as tolerated  - Strict I/O's, trend kidney function,  eval for hematuria, will send UA/UCx, continue tamsulosin and finasteride  - Routine post-op abx  - SCD's for DVT ppx  - Discussed with patient in PACU  CC time spent: 35 min
77 year old s/p spinal fusion surgery.

## 2018-03-30 NOTE — CONSULT NOTE ADULT - PROBLEM SELECTOR PROBLEM 1
Spinal stenosis of lumbar region, unspecified whether neurogenic claudication present
Hematuria, gross
Spinal stenosis of lumbar region, unspecified whether neurogenic claudication present

## 2018-03-30 NOTE — PROGRESS NOTE ADULT - ASSESSMENT
77M with extensive PMHx including htn, chronic back pain, spinal stenosis, bph, s/p left knee meniscus repair, s/p inguinal hernia repair, ETOH-drinks 2-3 beers/day, admitted to ICU s/p L2-S1 laminectomies and spinal fusion POD 1.  EBL 1200cc, 500cc back by cellsaver, MADHAV, Hypomagnesemia, traumatic mckinney insertion with hematuria    -Pain management  -Neurovascular checks as per protocol  -Keep MAPS > 80. May require pressor usage. CCB/BB being held overnight.  -Monitor HD's. DC IVF's once Pt tolerating PO diet  -Respiratory stable  -PO diet. PPI  -Monitor HEATHER drainage. Goal Hgb > 8.5  -Monitor UOP/Lytes. Replete Mg  -Continue Mckinney  -Surgical management per Ortho. Steroids x 3 doses  -DVT ppx  -Supportive care

## 2018-03-31 LAB
ANION GAP SERPL CALC-SCNC: 8 MMOL/L — SIGNIFICANT CHANGE UP (ref 5–17)
BASOPHILS # BLD AUTO: 0 K/UL — SIGNIFICANT CHANGE UP (ref 0–0.2)
BASOPHILS NFR BLD AUTO: 0.3 % — SIGNIFICANT CHANGE UP (ref 0–2)
BUN SERPL-MCNC: 34 MG/DL — HIGH (ref 7–23)
CALCIUM SERPL-MCNC: 8.3 MG/DL — LOW (ref 8.5–10.1)
CHLORIDE SERPL-SCNC: 104 MMOL/L — SIGNIFICANT CHANGE UP (ref 96–108)
CO2 SERPL-SCNC: 27 MMOL/L — SIGNIFICANT CHANGE UP (ref 22–31)
CREAT SERPL-MCNC: 1.3 MG/DL — SIGNIFICANT CHANGE UP (ref 0.5–1.3)
EOSINOPHIL # BLD AUTO: 0 K/UL — SIGNIFICANT CHANGE UP (ref 0–0.5)
EOSINOPHIL NFR BLD AUTO: 0 % — SIGNIFICANT CHANGE UP (ref 0–6)
GLUCOSE SERPL-MCNC: 123 MG/DL — HIGH (ref 70–99)
HCT VFR BLD CALC: 34.6 % — LOW (ref 39–50)
HGB BLD-MCNC: 11.8 G/DL — LOW (ref 13–17)
LYMPHOCYTES # BLD AUTO: 0.7 K/UL — LOW (ref 1–3.3)
LYMPHOCYTES # BLD AUTO: 4.2 % — LOW (ref 13–44)
MAGNESIUM SERPL-MCNC: 2.7 MG/DL — HIGH (ref 1.6–2.6)
MCHC RBC-ENTMCNC: 31.8 PG — SIGNIFICANT CHANGE UP (ref 27–34)
MCHC RBC-ENTMCNC: 34.1 GM/DL — SIGNIFICANT CHANGE UP (ref 32–36)
MCV RBC AUTO: 93.2 FL — SIGNIFICANT CHANGE UP (ref 80–100)
MONOCYTES # BLD AUTO: 2 K/UL — HIGH (ref 0–0.9)
MONOCYTES NFR BLD AUTO: 12.7 % — HIGH (ref 1–9)
NEUTROPHILS # BLD AUTO: 13 K/UL — HIGH (ref 1.8–7.4)
NEUTROPHILS NFR BLD AUTO: 82.8 % — HIGH (ref 43–77)
PHOSPHATE SERPL-MCNC: 2.7 MG/DL — SIGNIFICANT CHANGE UP (ref 2.5–4.5)
PLATELET # BLD AUTO: 183 K/UL — SIGNIFICANT CHANGE UP (ref 150–400)
POTASSIUM SERPL-MCNC: 4.4 MMOL/L — SIGNIFICANT CHANGE UP (ref 3.5–5.3)
POTASSIUM SERPL-SCNC: 4.4 MMOL/L — SIGNIFICANT CHANGE UP (ref 3.5–5.3)
RBC # BLD: 3.71 M/UL — LOW (ref 4.2–5.8)
RBC # FLD: 11.6 % — SIGNIFICANT CHANGE UP (ref 10.3–14.5)
SODIUM SERPL-SCNC: 139 MMOL/L — SIGNIFICANT CHANGE UP (ref 135–145)
WBC # BLD: 15.8 K/UL — HIGH (ref 3.8–10.5)
WBC # FLD AUTO: 15.8 K/UL — HIGH (ref 3.8–10.5)

## 2018-03-31 RX ADMIN — Medication 100 MILLIGRAM(S): at 17:27

## 2018-03-31 RX ADMIN — Medication 100 MILLIGRAM(S): at 13:24

## 2018-03-31 RX ADMIN — Medication 100 MILLIGRAM(S): at 00:10

## 2018-03-31 RX ADMIN — Medication 1 TABLET(S): at 13:24

## 2018-03-31 RX ADMIN — HYDROMORPHONE HYDROCHLORIDE 1 MILLIGRAM(S): 2 INJECTION INTRAMUSCULAR; INTRAVENOUS; SUBCUTANEOUS at 09:30

## 2018-03-31 RX ADMIN — Medication 100 MILLIGRAM(S): at 23:55

## 2018-03-31 RX ADMIN — AMLODIPINE BESYLATE 2.5 MILLIGRAM(S): 2.5 TABLET ORAL at 05:09

## 2018-03-31 RX ADMIN — MAGNESIUM HYDROXIDE 30 MILLILITER(S): 400 TABLET, CHEWABLE ORAL at 13:17

## 2018-03-31 RX ADMIN — HYDROMORPHONE HYDROCHLORIDE 1 MILLIGRAM(S): 2 INJECTION INTRAMUSCULAR; INTRAVENOUS; SUBCUTANEOUS at 13:28

## 2018-03-31 RX ADMIN — HYDROMORPHONE HYDROCHLORIDE 1 MILLIGRAM(S): 2 INJECTION INTRAMUSCULAR; INTRAVENOUS; SUBCUTANEOUS at 21:40

## 2018-03-31 RX ADMIN — Medication 1 TABLET(S): at 05:09

## 2018-03-31 RX ADMIN — Medication 1 TABLET(S): at 21:26

## 2018-03-31 RX ADMIN — Medication 100 MILLIGRAM(S): at 09:33

## 2018-03-31 RX ADMIN — Medication 100 MILLIGRAM(S): at 21:26

## 2018-03-31 RX ADMIN — TAMSULOSIN HYDROCHLORIDE 0.4 MILLIGRAM(S): 0.4 CAPSULE ORAL at 11:20

## 2018-03-31 RX ADMIN — HYDROMORPHONE HYDROCHLORIDE 1 MILLIGRAM(S): 2 INJECTION INTRAMUSCULAR; INTRAVENOUS; SUBCUTANEOUS at 17:30

## 2018-03-31 RX ADMIN — HYDROMORPHONE HYDROCHLORIDE 1 MILLIGRAM(S): 2 INJECTION INTRAMUSCULAR; INTRAVENOUS; SUBCUTANEOUS at 09:45

## 2018-03-31 RX ADMIN — Medication 100 MILLIGRAM(S): at 05:09

## 2018-03-31 RX ADMIN — HYDROMORPHONE HYDROCHLORIDE 1 MILLIGRAM(S): 2 INJECTION INTRAMUSCULAR; INTRAVENOUS; SUBCUTANEOUS at 22:15

## 2018-03-31 RX ADMIN — FINASTERIDE 5 MILLIGRAM(S): 5 TABLET, FILM COATED ORAL at 11:20

## 2018-03-31 RX ADMIN — HYDROMORPHONE HYDROCHLORIDE 1 MILLIGRAM(S): 2 INJECTION INTRAMUSCULAR; INTRAVENOUS; SUBCUTANEOUS at 05:09

## 2018-03-31 RX ADMIN — HYDROMORPHONE HYDROCHLORIDE 1 MILLIGRAM(S): 2 INJECTION INTRAMUSCULAR; INTRAVENOUS; SUBCUTANEOUS at 13:45

## 2018-03-31 NOTE — PHYSICAL THERAPY INITIAL EVALUATION ADULT - TRANSFER TRAINING, PT EVAL
Pt will be MinAx1 level of assistance for sit to stand/ stand to sit transfers with appropriate AD in 3-5 sessions

## 2018-03-31 NOTE — PHYSICAL THERAPY INITIAL EVALUATION ADULT - PERTINENT HX OF CURRENT PROBLEM, REHAB EVAL
Per H&P: 78 yo M for L2-S1 laminectomy   instrumented fusion   autograft  allograft  plastic closure utilizing fluoroscopy - dayne  c/o back pain and numbness to bilat feet X 1 year

## 2018-03-31 NOTE — PHYSICAL THERAPY INITIAL EVALUATION ADULT - PASSIVE RANGE OF MOTION EXAMINATION, REHAB EVAL
Left LE Passive ROM was WFL (within functional limits)/Right LE Passive ROM was WFL (within functional limits)

## 2018-03-31 NOTE — PROGRESS NOTE ADULT - SUBJECTIVE AND OBJECTIVE BOX
Pt S/E at bedside, no acute events overnight, pain controlled    Vital Signs Last 24 Hrs  T(C): 36.8 (31 Mar 2018 07:25), Max: 37.5 (31 Mar 2018 00:06)  T(F): 98.3 (31 Mar 2018 07:25), Max: 99.5 (31 Mar 2018 00:06)  HR: 71 (31 Mar 2018 07:25) (58 - 73)  BP: 120/66 (31 Mar 2018 07:25) (103/59 - 142/74)  BP(mean): 87 (30 Mar 2018 16:00) (74 - 87)  RR: 16 (31 Mar 2018 07:25) (8 - 25)  SpO2: 97% (31 Mar 2018 07:25) (88% - 97%)    Gen: NAD,     Spine:  Dressing clean dry intact  +HMV  +HEATHER  +EHL/FHL/TA/GS  +AIN/PIN/M/R/U/Msc/Ax  SILT L3-S1  SILT C5-T1  +DP Pulses  Compartments soft  No calf TTP B/L Pt S/E at bedside, no acute events overnight, pain controlled    Vital Signs Last 24 Hrs  T(C): 36.8 (31 Mar 2018 07:25), Max: 37.5 (31 Mar 2018 00:06)  T(F): 98.3 (31 Mar 2018 07:25), Max: 99.5 (31 Mar 2018 00:06)  HR: 71 (31 Mar 2018 07:25) (58 - 73)  BP: 120/66 (31 Mar 2018 07:25) (103/59 - 142/74)  BP(mean): 87 (30 Mar 2018 16:00) (74 - 87)  RR: 16 (31 Mar 2018 07:25) (8 - 25)  SpO2: 97% (31 Mar 2018 07:25) (88% - 97%)    Gen: NAD,     Spine:  Dressing clean dry intact  +HMV  +HEATHER  +EHL/FHL/TA/GS, R TA weak, at baseline since surgery  +AIN/PIN/M/R/U/Msc/Ax  SILT L3-S1  SILT C5-T1  +DP Pulses  Compartments soft  No calf TTP B/L

## 2018-03-31 NOTE — PHYSICAL THERAPY INITIAL EVALUATION ADULT - CRITERIA FOR SKILLED THERAPEUTIC INTERVENTIONS
functional limitations in following categories/therapy frequency/impairments found/anticipated discharge recommendation/risk reduction/prevention/rehab potential

## 2018-03-31 NOTE — PHYSICAL THERAPY INITIAL EVALUATION ADULT - ADDITIONAL COMMENTS
Pt reports that prior to surgery he lives alone, 1 step to enter, ambulated independently with no AD.

## 2018-03-31 NOTE — PROGRESS NOTE ADULT - ATTENDING COMMENTS
pt comfortable--will go to [facility] continue physical therapy.  discused with ortho resident.
77M PMH HTN, BPH, mild CKD, cervical stenosis s/p fusion, EtOH use, and lumbar spinal stenosis who presents for elective L2-S1 laminectomy with spinal fusion. OR course complicated by 1200mL EBL, 500mL returned by cellsaver. Also with traumatic mckinney and hematuria.    - H/H stable, no significant bleeding, keep HEATHER & hemovac per Ortho  - Pain control, diazepam prn muscle spasms  - HD stable, restart amlodipine  - Incentive spirometry, supp O2 prn  - Regular diet as tolerated  - Stable kidney function, f/up urology, resolved hematuria, keep mckinney until tomorrow then d/c if OOB, continue tamsulosin and finasteride  - Routine post-op abx while drains in place  - SCD's for DVT ppx  - Discussed with patient at bedside  - Stable for surgical floors

## 2018-03-31 NOTE — PROGRESS NOTE ADULT - SUBJECTIVE AND OBJECTIVE BOX
Patient is a 77y old  Male who presents with a chief complaint of L2-S1 Laminectomy and Fusion (29 Mar 2018 13:31)      INTERVAL HPI/OVERNIGHT EVENTS:  T(C): 36.8 (18 @ 07:25), Max: 37.5 (18 @ 00:06)  HR: 71 (18 @ 07:25) (59 - 73)  BP: 120/66 (18 @ 07:25) (103/59 - 142/74)  RR: 16 (18 @ 07:25) (9 - 25)  SpO2: 97% (18 @ 07:25) (88% - 97%)  Wt(kg): --  I&O's Summary    30 Mar 2018 07:01  -  31 Mar 2018 07:00  --------------------------------------------------------  IN: 100 mL / OUT: 4980 mL / NET: -4880 mL        PAST MEDICAL & SURGICAL HISTORY:  Skin cancer  Sciatica  HTN (hypertension)  Numbness  Back pain  Arthritis  [place] (hard of hearing): right hearing aid  Spinal stenosis in cervical region  BPH (benign prostatic hypertrophy)  Elective surgery: Mohs  neck   Elective surgery: cataract extraction   Elective surgery: cervical fusion   H/O left knee surgery:  - repair of torn meniscus  Inguinal hernia: h/o repair - right      SOCIAL HISTORY  Alcohol:  Tobacco:  Illicit substance use:      FAMILY HISTORY:      LABS:                        11.8   15.8  )-----------( 183      ( 31 Mar 2018 06:14 )             34.6         139  |  104  |  34<H>  ----------------------------<  123<H>  4.4   |  27  |  1.30    Ca    8.3<L>      31 Mar 2018 06:14  Phos  2.7       Mg     2.7             Urinalysis Basic - ( 29 Mar 2018 17:52 )    Color: Red / Appearance: Turbid / S.015 / pH: x  Gluc: x / Ketone: Small  / Bili: Negative / Urobili: Negative   Blood: x / Protein: 75 mg/dL / Nitrite: Negative   Leuk Esterase: Trace / RBC: >50 /HPF / WBC 6-10   Sq Epi: x / Non Sq Epi: Occasional / Bacteria: Few      CAPILLARY BLOOD GLUCOSE            Urinalysis Basic - ( 29 Mar 2018 17:52 )    Color: Red / Appearance: Turbid / S.015 / pH: x  Gluc: x / Ketone: Small  / Bili: Negative / Urobili: Negative   Blood: x / Protein: 75 mg/dL / Nitrite: Negative   Leuk Esterase: Trace / RBC: >50 /HPF / WBC 6-10   Sq Epi: x / Non Sq Epi: Occasional / Bacteria: Few        MEDICATIONS  (STANDING):  amLODIPine   Tablet 2.5 milliGRAM(s) Oral daily  calcium carbonate 1250 mG + Vitamin D (OsCal 500 + D) 1 Tablet(s) Oral three times a day  ceFAZolin   IVPB 2000 milliGRAM(s) IV Intermittent every 8 hours  docusate sodium 100 milliGRAM(s) Oral three times a day  finasteride 5 milliGRAM(s) Oral daily  tamsulosin 0.4 milliGRAM(s) Oral daily    MEDICATIONS  (PRN):  acetaminophen   Tablet 650 milliGRAM(s) Oral every 6 hours PRN For Temp greater than 38 C (100.4 F)  acetaminophen   Tablet. 650 milliGRAM(s) Oral every 6 hours PRN Headache  aluminum hydroxide/magnesium hydroxide/simethicone Suspension 30 milliLiter(s) Oral every 12 hours PRN Indigestion  benzocaine 15 mG/menthol 3.6 mG Lozenge 1 Lozenge Oral every 2 hours PRN Sore Throat  diazepam    Tablet 5 milliGRAM(s) Oral every 8 hours PRN Muscle Spasms  HYDROmorphone  Injectable 1 milliGRAM(s) IV Push every 4 hours PRN Severe Pain (7 - 10)  magnesium hydroxide Suspension 30 milliLiter(s) Oral every 12 hours PRN Constipation  ondansetron Injectable 4 milliGRAM(s) IV Push every 6 hours PRN Nausea  oxyCODONE    IR 5 milliGRAM(s) Oral every 4 hours PRN Mild Pain (1 - 3)  oxyCODONE    IR 10 milliGRAM(s) Oral every 4 hours PRN Moderate Pain (4 - 6)      REVIEW OF SYSTEMS:  CONSTITUTIONAL: No fever, weight loss, or fatigue  EYES: No eye pain, visual disturbances, or discharge  ENMT:  No difficulty hearing, tinnitus, vertigo; No sinus or throat pain  NECK: No pain or stiffness  RESPIRATORY: No cough, wheezing, chills or hemoptysis; No shortness of breath  CARDIOVASCULAR: No chest pain, palpitations, dizziness, or leg swelling  GASTROINTESTINAL: No abdominal or epigastric pain. No nausea, vomiting, or hematemesis; No diarrhea or constipation. No melena or hematochezia.  GENITOURINARY: No dysuria, frequency, hematuria, or incontinence  NEUROLOGICAL: No headaches, memory loss, loss of strength, numbness, or tremors  MUSCULOSKELETAL: pain well contriollled mnor paion at the surgical site      RADIOLOGY & ADDITIONAL TESTS:    Imaging Personally Reviewed:  [ ] YES  [ ] NO    Consultant(s) Notes Reviewed:  [ ] YES  [ ] NO    PHYSICAL EXAM:  GENERAL: NAD, well-groomed, well-developed    NERVOUS SYSTEM:  Alert & Oriented X3, Good concentration; Motor Strength 5/5 B/L upper and lower extremities; DTRs 2+ intact and symmetric  CHEST/LUNG: Clear to percussion bilaterally; No rales, rhonchi, wheezing, or rubs  HEART: Regular rate and rhythm; No murmurs, rubs, or gallops  ABDOMEN: Soft, Nontender, Nondistended; Bowel sounds present  EXTREMITIES:  pt has dectreased strength on dorisi flexion of the right ankle ortho is aware and has ordered an ankle brace      Care Discussed with Consultants/Other Providers [ ] YES  [ ] NO

## 2018-03-31 NOTE — PHYSICAL THERAPY INITIAL EVALUATION ADULT - ACTIVE RANGE OF MOTION EXAMINATION, REHAB EVAL
Left LE Active ROM was WFL (within functional limits)/deficits as listed below/Lacking active R ankle dorsiflexion

## 2018-03-31 NOTE — PROGRESS NOTE ADULT - ASSESSMENT
A/P: 77M s/p L2-S1 Laminectomy and posterior spinal fusion POD 2  Pain Control  DVT ppx with SCDs and start SubQ heparin today  WBAT  PT/OT  Monitor Mckinney output, will keep mckinney per Urology recommendations 48 hours or until ambulating  Monitor Drain output, management per plastic surgery  Ancef while drains are in  Incentive Spirometry  Medical consult appreciated  DC planning A/P: 77M s/p L2-S1 Laminectomy and posterior spinal fusion POD 2  Pain Control  DVT ppx with SCDs only no chemical AC  WBAT  PT/OT  Monitor Mckinney output, will keep mckinney per Urology recommendations 48 hours or until ambulating  Monitor Drain output, management per plastic surgery  Ancef while drains are in  Incentive Spirometry  Medical consult appreciated  DC planning A/P: 77M s/p L2-S1 Laminectomy and posterior spinal fusion POD 2  Pain Control  DVT ppx with SCDs only no chemical AC  WBAT with LSO brace and Right AFO brace  PT/OT  Monitor Mckinney output, will keep mckinney per Urology recommendations 48 hours or until ambulating  Monitor Drain output, management per plastic surgery  Ancef while drains are in  Incentive Spirometry  Medical consult appreciated  DC planning

## 2018-04-01 LAB
ANION GAP SERPL CALC-SCNC: 7 MMOL/L — SIGNIFICANT CHANGE UP (ref 5–17)
BASOPHILS # BLD AUTO: 0.1 K/UL — SIGNIFICANT CHANGE UP (ref 0–0.2)
BASOPHILS NFR BLD AUTO: 0.6 % — SIGNIFICANT CHANGE UP (ref 0–2)
BUN SERPL-MCNC: 24 MG/DL — HIGH (ref 7–23)
CALCIUM SERPL-MCNC: 8.6 MG/DL — SIGNIFICANT CHANGE UP (ref 8.5–10.1)
CHLORIDE SERPL-SCNC: 101 MMOL/L — SIGNIFICANT CHANGE UP (ref 96–108)
CO2 SERPL-SCNC: 29 MMOL/L — SIGNIFICANT CHANGE UP (ref 22–31)
CREAT SERPL-MCNC: 1.1 MG/DL — SIGNIFICANT CHANGE UP (ref 0.5–1.3)
EOSINOPHIL # BLD AUTO: 0 K/UL — SIGNIFICANT CHANGE UP (ref 0–0.5)
EOSINOPHIL NFR BLD AUTO: 0.2 % — SIGNIFICANT CHANGE UP (ref 0–6)
GLUCOSE SERPL-MCNC: 101 MG/DL — HIGH (ref 70–99)
HCT VFR BLD CALC: 36.8 % — LOW (ref 39–50)
HGB BLD-MCNC: 12.5 G/DL — LOW (ref 13–17)
LYMPHOCYTES # BLD AUTO: 1 K/UL — SIGNIFICANT CHANGE UP (ref 1–3.3)
LYMPHOCYTES # BLD AUTO: 8.2 % — LOW (ref 13–44)
MCHC RBC-ENTMCNC: 31.5 PG — SIGNIFICANT CHANGE UP (ref 27–34)
MCHC RBC-ENTMCNC: 34.1 GM/DL — SIGNIFICANT CHANGE UP (ref 32–36)
MCV RBC AUTO: 92.5 FL — SIGNIFICANT CHANGE UP (ref 80–100)
MONOCYTES # BLD AUTO: 1.3 K/UL — HIGH (ref 0–0.9)
MONOCYTES NFR BLD AUTO: 10.9 % — HIGH (ref 1–9)
NEUTROPHILS # BLD AUTO: 9.7 K/UL — HIGH (ref 1.8–7.4)
NEUTROPHILS NFR BLD AUTO: 80.1 % — HIGH (ref 43–77)
PLATELET # BLD AUTO: 182 K/UL — SIGNIFICANT CHANGE UP (ref 150–400)
POTASSIUM SERPL-MCNC: 4.1 MMOL/L — SIGNIFICANT CHANGE UP (ref 3.5–5.3)
POTASSIUM SERPL-SCNC: 4.1 MMOL/L — SIGNIFICANT CHANGE UP (ref 3.5–5.3)
RBC # BLD: 3.98 M/UL — LOW (ref 4.2–5.8)
RBC # FLD: 11.7 % — SIGNIFICANT CHANGE UP (ref 10.3–14.5)
SODIUM SERPL-SCNC: 137 MMOL/L — SIGNIFICANT CHANGE UP (ref 135–145)
WBC # BLD: 12.1 K/UL — HIGH (ref 3.8–10.5)
WBC # FLD AUTO: 12.1 K/UL — HIGH (ref 3.8–10.5)

## 2018-04-01 RX ADMIN — AMLODIPINE BESYLATE 2.5 MILLIGRAM(S): 2.5 TABLET ORAL at 05:57

## 2018-04-01 RX ADMIN — Medication 1 TABLET(S): at 05:58

## 2018-04-01 RX ADMIN — HYDROMORPHONE HYDROCHLORIDE 1 MILLIGRAM(S): 2 INJECTION INTRAMUSCULAR; INTRAVENOUS; SUBCUTANEOUS at 10:39

## 2018-04-01 RX ADMIN — HYDROMORPHONE HYDROCHLORIDE 1 MILLIGRAM(S): 2 INJECTION INTRAMUSCULAR; INTRAVENOUS; SUBCUTANEOUS at 18:27

## 2018-04-01 RX ADMIN — Medication 100 MILLIGRAM(S): at 14:21

## 2018-04-01 RX ADMIN — Medication 100 MILLIGRAM(S): at 08:48

## 2018-04-01 RX ADMIN — HYDROMORPHONE HYDROCHLORIDE 1 MILLIGRAM(S): 2 INJECTION INTRAMUSCULAR; INTRAVENOUS; SUBCUTANEOUS at 23:00

## 2018-04-01 RX ADMIN — Medication 1 TABLET(S): at 14:21

## 2018-04-01 RX ADMIN — HYDROMORPHONE HYDROCHLORIDE 1 MILLIGRAM(S): 2 INJECTION INTRAMUSCULAR; INTRAVENOUS; SUBCUTANEOUS at 10:11

## 2018-04-01 RX ADMIN — FINASTERIDE 5 MILLIGRAM(S): 5 TABLET, FILM COATED ORAL at 12:09

## 2018-04-01 RX ADMIN — HYDROMORPHONE HYDROCHLORIDE 1 MILLIGRAM(S): 2 INJECTION INTRAMUSCULAR; INTRAVENOUS; SUBCUTANEOUS at 14:21

## 2018-04-01 RX ADMIN — HYDROMORPHONE HYDROCHLORIDE 1 MILLIGRAM(S): 2 INJECTION INTRAMUSCULAR; INTRAVENOUS; SUBCUTANEOUS at 22:35

## 2018-04-01 RX ADMIN — HYDROMORPHONE HYDROCHLORIDE 1 MILLIGRAM(S): 2 INJECTION INTRAMUSCULAR; INTRAVENOUS; SUBCUTANEOUS at 02:16

## 2018-04-01 RX ADMIN — HYDROMORPHONE HYDROCHLORIDE 1 MILLIGRAM(S): 2 INJECTION INTRAMUSCULAR; INTRAVENOUS; SUBCUTANEOUS at 06:36

## 2018-04-01 RX ADMIN — HYDROMORPHONE HYDROCHLORIDE 1 MILLIGRAM(S): 2 INJECTION INTRAMUSCULAR; INTRAVENOUS; SUBCUTANEOUS at 01:40

## 2018-04-01 RX ADMIN — Medication 100 MILLIGRAM(S): at 21:15

## 2018-04-01 RX ADMIN — Medication 1 TABLET(S): at 21:15

## 2018-04-01 RX ADMIN — HYDROMORPHONE HYDROCHLORIDE 1 MILLIGRAM(S): 2 INJECTION INTRAMUSCULAR; INTRAVENOUS; SUBCUTANEOUS at 05:57

## 2018-04-01 RX ADMIN — HYDROMORPHONE HYDROCHLORIDE 1 MILLIGRAM(S): 2 INJECTION INTRAMUSCULAR; INTRAVENOUS; SUBCUTANEOUS at 15:03

## 2018-04-01 RX ADMIN — Medication 100 MILLIGRAM(S): at 05:58

## 2018-04-01 RX ADMIN — Medication 100 MILLIGRAM(S): at 16:34

## 2018-04-01 RX ADMIN — TAMSULOSIN HYDROCHLORIDE 0.4 MILLIGRAM(S): 0.4 CAPSULE ORAL at 12:09

## 2018-04-01 RX ADMIN — HYDROMORPHONE HYDROCHLORIDE 1 MILLIGRAM(S): 2 INJECTION INTRAMUSCULAR; INTRAVENOUS; SUBCUTANEOUS at 18:24

## 2018-04-01 RX ADMIN — Medication 100 MILLIGRAM(S): at 23:50

## 2018-04-01 NOTE — PROGRESS NOTE ADULT - SUBJECTIVE AND OBJECTIVE BOX
Patient is a 77y old  Male who presents with a chief complaint of L2-S1 Laminectomy and Fusion (29 Mar 2018 13:31)      INTERVAL HPI/OVERNIGHT EVENTS:  T(C): 36.9 (04-01-18 @ 07:45), Max: 37.1 (03-31-18 @ 23:55)  HR: 79 (04-01-18 @ 07:45) (61 - 81)  BP: 150/81 (04-01-18 @ 07:45) (126/72 - 150/81)  RR: 17 (04-01-18 @ 07:45) (16 - 17)  SpO2: 94% (04-01-18 @ 07:45) (92% - 94%)  Wt(kg): --  I&O's Summary    31 Mar 2018 07:01  -  01 Apr 2018 07:00  --------------------------------------------------------  IN: 480 mL / OUT: 1610 mL / NET: -1130 mL        LABS:                        12.5   12.1  )-----------( 182      ( 01 Apr 2018 05:26 )             36.8     04-01    137  |  101  |  24<H>  ----------------------------<  101<H>  4.1   |  29  |  1.10    Ca    8.6      01 Apr 2018 05:26  Phos  2.7     03-31  Mg     2.7     03-31          CAPILLARY BLOOD GLUCOSE                MEDICATIONS  (STANDING):  amLODIPine   Tablet 2.5 milliGRAM(s) Oral daily  calcium carbonate 1250 mG + Vitamin D (OsCal 500 + D) 1 Tablet(s) Oral three times a day  ceFAZolin   IVPB 2000 milliGRAM(s) IV Intermittent every 8 hours  docusate sodium 100 milliGRAM(s) Oral three times a day  finasteride 5 milliGRAM(s) Oral daily  tamsulosin 0.4 milliGRAM(s) Oral daily    MEDICATIONS  (PRN):  acetaminophen   Tablet 650 milliGRAM(s) Oral every 6 hours PRN For Temp greater than 38 C (100.4 F)  acetaminophen   Tablet. 650 milliGRAM(s) Oral every 6 hours PRN Headache  aluminum hydroxide/magnesium hydroxide/simethicone Suspension 30 milliLiter(s) Oral every 12 hours PRN Indigestion  benzocaine 15 mG/menthol 3.6 mG Lozenge 1 Lozenge Oral every 2 hours PRN Sore Throat  diazepam    Tablet 5 milliGRAM(s) Oral every 8 hours PRN Muscle Spasms  HYDROmorphone  Injectable 1 milliGRAM(s) IV Push every 4 hours PRN Severe Pain (7 - 10)  magnesium hydroxide Suspension 30 milliLiter(s) Oral every 12 hours PRN Constipation  ondansetron Injectable 4 milliGRAM(s) IV Push every 6 hours PRN Nausea  oxyCODONE    IR 5 milliGRAM(s) Oral every 4 hours PRN Mild Pain (1 - 3)  oxyCODONE    IR 10 milliGRAM(s) Oral every 4 hours PRN Moderate Pain (4 - 6)          PHYSICAL EXAM:  GENERAL: NAD, well-groomed, well-developed  HEAD:  Atraumatic, Normocephalic  CHEST/LUNG: Clear to percussion bilaterally; No rales, rhonchi, wheezing, or rubs  HEART: Regular rate and rhythm; No murmurs, rubs, or gallops  ABDOMEN: Soft, Nontender, Nondistended; Bowel sounds present  EXTREMITIES:  2+ Peripheral Pulses, No clubbing, cyanosis, or edema  LYMPH: No lymphadenopathy noted  SKIN: No rashes or lesions    Care Discussed with Consultants/Other Providers [ ] YES  [ ] NO

## 2018-04-01 NOTE — PROGRESS NOTE ADULT - SUBJECTIVE AND OBJECTIVE BOX
Pt S/E at bedside, no acute events overnight, pain controlled    Vital Signs Last 24 Hrs  T(C): 36.9 (01 Apr 2018 07:45), Max: 37.1 (31 Mar 2018 23:55)  T(F): 98.4 (01 Apr 2018 07:45), Max: 98.7 (31 Mar 2018 23:55)  HR: 79 (01 Apr 2018 07:45) (61 - 81)  BP: 150/81 (01 Apr 2018 07:45) (126/72 - 150/81)  BP(mean): --  RR: 17 (01 Apr 2018 07:45) (16 - 17)  SpO2: 94% (01 Apr 2018 07:45) (92% - 94%)    Gen: NAD,    Spine:  Dressing clean dry intact  +HMV  +HEATHER  +EHL/FHL/TA/GS, R TA wake, unchanged from prior exam  +AIN/PIN/M/R/U/Msc/Ax  SILT L3-S1  SILT C5-T1  +DP Pulses  Compartments soft  No calf TTP B/L

## 2018-04-01 NOTE — PROGRESS NOTE ADULT - ASSESSMENT
77y old  Male sp  L2-S1 Laminectomy and Fusion  - pain control  - management as per ortho  - PT and dc planning to rehab

## 2018-04-01 NOTE — PROGRESS NOTE ADULT - ASSESSMENT
A/P: 77M s/p L2-S1 Laminectomy and posterior spinal fusion POD 3  Pain Control  DVT ppx with SCDs only no chemical AC  WBAT with LSO brace and Right AFO brace  PT/OT  Higgins catheter removed yesterday per Urology recommendations  Monitor Drain output, management per plastic surgery  Ancef while drains are in  Incentive Spirometry  Medical consult appreciated  DC planning

## 2018-04-02 RX ORDER — CEPHALEXIN 500 MG
1 CAPSULE ORAL
Qty: 0 | Refills: 0 | COMMUNITY

## 2018-04-02 RX ORDER — HYDROMORPHONE HYDROCHLORIDE 2 MG/ML
4 INJECTION INTRAMUSCULAR; INTRAVENOUS; SUBCUTANEOUS EVERY 6 HOURS
Qty: 0 | Refills: 0 | Status: DISCONTINUED | OUTPATIENT
Start: 2018-04-02 | End: 2018-04-03

## 2018-04-02 RX ORDER — OXYCODONE HYDROCHLORIDE 5 MG/1
1 TABLET ORAL
Qty: 0 | Refills: 0 | COMMUNITY
Start: 2018-04-02

## 2018-04-02 RX ADMIN — Medication 1 TABLET(S): at 05:28

## 2018-04-02 RX ADMIN — HYDROMORPHONE HYDROCHLORIDE 1 MILLIGRAM(S): 2 INJECTION INTRAMUSCULAR; INTRAVENOUS; SUBCUTANEOUS at 02:33

## 2018-04-02 RX ADMIN — Medication 100 MILLIGRAM(S): at 05:28

## 2018-04-02 RX ADMIN — Medication 100 MILLIGRAM(S): at 21:16

## 2018-04-02 RX ADMIN — Medication 100 MILLIGRAM(S): at 23:03

## 2018-04-02 RX ADMIN — Medication 100 MILLIGRAM(S): at 17:25

## 2018-04-02 RX ADMIN — TAMSULOSIN HYDROCHLORIDE 0.4 MILLIGRAM(S): 0.4 CAPSULE ORAL at 11:26

## 2018-04-02 RX ADMIN — AMLODIPINE BESYLATE 2.5 MILLIGRAM(S): 2.5 TABLET ORAL at 05:28

## 2018-04-02 RX ADMIN — FINASTERIDE 5 MILLIGRAM(S): 5 TABLET, FILM COATED ORAL at 11:25

## 2018-04-02 RX ADMIN — Medication 1 TABLET(S): at 21:16

## 2018-04-02 RX ADMIN — HYDROMORPHONE HYDROCHLORIDE 4 MILLIGRAM(S): 2 INJECTION INTRAMUSCULAR; INTRAVENOUS; SUBCUTANEOUS at 10:40

## 2018-04-02 RX ADMIN — HYDROMORPHONE HYDROCHLORIDE 4 MILLIGRAM(S): 2 INJECTION INTRAMUSCULAR; INTRAVENOUS; SUBCUTANEOUS at 11:22

## 2018-04-02 RX ADMIN — HYDROMORPHONE HYDROCHLORIDE 4 MILLIGRAM(S): 2 INJECTION INTRAMUSCULAR; INTRAVENOUS; SUBCUTANEOUS at 23:02

## 2018-04-02 RX ADMIN — HYDROMORPHONE HYDROCHLORIDE 4 MILLIGRAM(S): 2 INJECTION INTRAMUSCULAR; INTRAVENOUS; SUBCUTANEOUS at 16:41

## 2018-04-02 RX ADMIN — HYDROMORPHONE HYDROCHLORIDE 4 MILLIGRAM(S): 2 INJECTION INTRAMUSCULAR; INTRAVENOUS; SUBCUTANEOUS at 17:28

## 2018-04-02 RX ADMIN — HYDROMORPHONE HYDROCHLORIDE 1 MILLIGRAM(S): 2 INJECTION INTRAMUSCULAR; INTRAVENOUS; SUBCUTANEOUS at 03:00

## 2018-04-02 RX ADMIN — HYDROMORPHONE HYDROCHLORIDE 1 MILLIGRAM(S): 2 INJECTION INTRAMUSCULAR; INTRAVENOUS; SUBCUTANEOUS at 06:48

## 2018-04-02 RX ADMIN — Medication 5 MILLIGRAM(S): at 21:16

## 2018-04-02 RX ADMIN — Medication 100 MILLIGRAM(S): at 08:58

## 2018-04-02 RX ADMIN — MAGNESIUM HYDROXIDE 30 MILLILITER(S): 400 TABLET, CHEWABLE ORAL at 21:34

## 2018-04-02 RX ADMIN — Medication 1 TABLET(S): at 13:36

## 2018-04-02 RX ADMIN — Medication 100 MILLIGRAM(S): at 13:36

## 2018-04-02 RX ADMIN — Medication 5 MILLIGRAM(S): at 11:25

## 2018-04-02 NOTE — PROGRESS NOTE ADULT - SUBJECTIVE AND OBJECTIVE BOX
Pt S/E at bedside, no acute events overnight, pain controlled    Vital Signs Last 24 Hrs  T(C): 36.9 (02 Apr 2018 05:25), Max: 37.7 (01 Apr 2018 16:26)  T(F): 98.5 (02 Apr 2018 05:25), Max: 99.9 (01 Apr 2018 16:26)  HR: 86 (02 Apr 2018 05:25) (79 - 91)  BP: 148/70 (02 Apr 2018 05:25) (113/70 - 150/81)  BP(mean): --  RR: 16 (02 Apr 2018 05:25) (16 - 17)  SpO2: 94% (02 Apr 2018 01:57) (92% - 94%)    Gen: NAD,     Spine:  Dressing clean dry intact  +HMV  +HEATHER  +EHL/FHL/TA/GS  +AIN/PIN/M/R/U/Msc/Ax  SILT L3-S1  SILT C5-T1  +Radial Pulse  Compartments soft  No calf TTP B/L

## 2018-04-02 NOTE — PROGRESS NOTE ADULT - ASSESSMENT
A/P: 77M s/p L2-S1 Laminectomy and posterior spinal fusion POD 4  Pain Control  DVT ppx with SCDs only no chemical AC  WBAT with LSO brace and Right AFO brace  PT/OT  Monitor Drain output, management per plastic surgery  Ancef while drains are in  Incentive Spirometry  Medical consult appreciated  DC planning

## 2018-04-03 VITALS
HEART RATE: 94 BPM | TEMPERATURE: 98 F | SYSTOLIC BLOOD PRESSURE: 136 MMHG | DIASTOLIC BLOOD PRESSURE: 83 MMHG | OXYGEN SATURATION: 96 % | RESPIRATION RATE: 18 BRPM

## 2018-04-03 PROCEDURE — 84100 ASSAY OF PHOSPHORUS: CPT

## 2018-04-03 PROCEDURE — 86891 AUTOLOGOUS BLOOD OP SALVAGE: CPT

## 2018-04-03 PROCEDURE — 97530 THERAPEUTIC ACTIVITIES: CPT

## 2018-04-03 PROCEDURE — 97162 PT EVAL MOD COMPLEX 30 MIN: CPT

## 2018-04-03 PROCEDURE — 81001 URINALYSIS AUTO W/SCOPE: CPT

## 2018-04-03 PROCEDURE — 80048 BASIC METABOLIC PNL TOTAL CA: CPT

## 2018-04-03 PROCEDURE — 85018 HEMOGLOBIN: CPT

## 2018-04-03 PROCEDURE — C1889: CPT

## 2018-04-03 PROCEDURE — C1713: CPT

## 2018-04-03 PROCEDURE — 83735 ASSAY OF MAGNESIUM: CPT

## 2018-04-03 PROCEDURE — 76000 FLUOROSCOPY <1 HR PHYS/QHP: CPT

## 2018-04-03 PROCEDURE — 87086 URINE CULTURE/COLONY COUNT: CPT

## 2018-04-03 PROCEDURE — 97110 THERAPEUTIC EXERCISES: CPT

## 2018-04-03 PROCEDURE — 85027 COMPLETE CBC AUTOMATED: CPT

## 2018-04-03 RX ADMIN — FINASTERIDE 5 MILLIGRAM(S): 5 TABLET, FILM COATED ORAL at 11:33

## 2018-04-03 RX ADMIN — HYDROMORPHONE HYDROCHLORIDE 4 MILLIGRAM(S): 2 INJECTION INTRAMUSCULAR; INTRAVENOUS; SUBCUTANEOUS at 14:30

## 2018-04-03 RX ADMIN — HYDROMORPHONE HYDROCHLORIDE 4 MILLIGRAM(S): 2 INJECTION INTRAMUSCULAR; INTRAVENOUS; SUBCUTANEOUS at 13:01

## 2018-04-03 RX ADMIN — HYDROMORPHONE HYDROCHLORIDE 4 MILLIGRAM(S): 2 INJECTION INTRAMUSCULAR; INTRAVENOUS; SUBCUTANEOUS at 05:07

## 2018-04-03 RX ADMIN — AMLODIPINE BESYLATE 2.5 MILLIGRAM(S): 2.5 TABLET ORAL at 05:07

## 2018-04-03 RX ADMIN — Medication 100 MILLIGRAM(S): at 05:07

## 2018-04-03 RX ADMIN — TAMSULOSIN HYDROCHLORIDE 0.4 MILLIGRAM(S): 0.4 CAPSULE ORAL at 11:33

## 2018-04-03 RX ADMIN — Medication 1 TABLET(S): at 05:07

## 2018-04-03 RX ADMIN — HYDROMORPHONE HYDROCHLORIDE 4 MILLIGRAM(S): 2 INJECTION INTRAMUSCULAR; INTRAVENOUS; SUBCUTANEOUS at 06:52

## 2018-04-03 RX ADMIN — Medication 1 TABLET(S): at 13:01

## 2018-04-03 RX ADMIN — Medication 100 MILLIGRAM(S): at 13:01

## 2018-04-03 RX ADMIN — HYDROMORPHONE HYDROCHLORIDE 4 MILLIGRAM(S): 2 INJECTION INTRAMUSCULAR; INTRAVENOUS; SUBCUTANEOUS at 00:15

## 2018-04-03 NOTE — PROGRESS NOTE ADULT - PROVIDER SPECIALTY LIST ADULT
Critical Care
Hospitalist
Orthopedics
Plastic Surgery
Plastic Surgery
Critical Care
Internal Medicine

## 2018-04-03 NOTE — PROGRESS NOTE ADULT - SUBJECTIVE AND OBJECTIVE BOX
Patient is a 77y old  Male who presents with a chief complaint of L2-S1 Laminectomy and Fusion (29 Mar 2018 13:31)      INTERVAL HPI/OVERNIGHT EVENTS:  T(C): 36.7 (04-03-18 @ 07:19), Max: 36.8 (04-02-18 @ 09:30)  HR: 94 (04-03-18 @ 07:19) (89 - 95)  BP: 136/83 (04-03-18 @ 07:19) (117/74 - 165/71)  RR: 18 (04-03-18 @ 07:19) (17 - 20)  SpO2: 96% (04-03-18 @ 07:19) (95% - 96%)  Wt(kg): --  I&O's Summary    02 Apr 2018 07:01  -  03 Apr 2018 07:00  --------------------------------------------------------  IN: 0 mL / OUT: 2560 mL / NET: -2560 mL        LABS:              CAPILLARY BLOOD GLUCOSE                MEDICATIONS  (STANDING):  amLODIPine   Tablet 2.5 milliGRAM(s) Oral daily  calcium carbonate 1250 mG + Vitamin D (OsCal 500 + D) 1 Tablet(s) Oral three times a day  docusate sodium 100 milliGRAM(s) Oral three times a day  finasteride 5 milliGRAM(s) Oral daily  tamsulosin 0.4 milliGRAM(s) Oral daily    MEDICATIONS  (PRN):  acetaminophen   Tablet 650 milliGRAM(s) Oral every 6 hours PRN For Temp greater than 38 C (100.4 F)  acetaminophen   Tablet. 650 milliGRAM(s) Oral every 6 hours PRN Headache  aluminum hydroxide/magnesium hydroxide/simethicone Suspension 30 milliLiter(s) Oral every 12 hours PRN Indigestion  benzocaine 15 mG/menthol 3.6 mG Lozenge 1 Lozenge Oral every 2 hours PRN Sore Throat  diazepam    Tablet 5 milliGRAM(s) Oral every 8 hours PRN Muscle Spasms  HYDROmorphone   Tablet 4 milliGRAM(s) Oral every 6 hours PRN Severe Pain (7 - 10)  magnesium hydroxide Suspension 30 milliLiter(s) Oral every 12 hours PRN Constipation  ondansetron Injectable 4 milliGRAM(s) IV Push every 6 hours PRN Nausea  oxyCODONE    IR 5 milliGRAM(s) Oral every 4 hours PRN Mild Pain (1 - 3)  oxyCODONE    IR 10 milliGRAM(s) Oral every 4 hours PRN Moderate Pain (4 - 6)          PHYSICAL EXAM:  GENERAL: NAD, well-groomed, well-developed  HEAD:  Atraumatic, Normocephalic  CHEST/LUNG: Clear to percussion bilaterally; No rales, rhonchi, wheezing, or rubs  HEART: Regular rate and rhythm; No murmurs, rubs, or gallops  ABDOMEN: Soft, Nontender, Nondistended; Bowel sounds present  EXTREMITIES:  2+ Peripheral Pulses, No clubbing, cyanosis, or edema  LYMPH: No lymphadenopathy noted  SKIN: No rashes or lesions    Care Discussed with Consultants/Other Providers [ ] YES  [ ] NO

## 2018-04-03 NOTE — PROGRESS NOTE ADULT - ASSESSMENT
A/P: 77M s/p L2-S1 Laminectomy and posterior spinal fusion POD 5    Pain Control  DVT ppx with SCDs only no chemical AC  WBAT with LSO brace and Right AFO brace  PT/OT  Incentive Spirometry  Medical consult appreciated  DC planning

## 2018-04-03 NOTE — PROGRESS NOTE ADULT - SUBJECTIVE AND OBJECTIVE BOX
Pt seen and examined  AVSS NAD    Lumbar incisioN; CDI with nylons.  No open areas, no drainage, no erythema  HEATHER and HV removed    A/P: Follow up with Dr. Stokes in 2 wks for suture removal  may wash/shower- no direct water to incision  clean and dry dressing daily prn drainage  [phone]

## 2018-04-03 NOTE — PROGRESS NOTE ADULT - SUBJECTIVE AND OBJECTIVE BOX
Pt S/E at bedside, no acute events overnight, pain controlled    Vital Signs Last 24 Hrs  T(C): 36.7 (03 Apr 2018 07:19), Max: 36.8 (02 Apr 2018 09:30)  T(F): 98 (03 Apr 2018 07:19), Max: 98.2 (02 Apr 2018 09:30)  HR: 94 (03 Apr 2018 07:19) (89 - 95)  BP: 136/83 (03 Apr 2018 07:19) (117/74 - 165/71)  BP(mean): --  RR: 18 (03 Apr 2018 07:19) (17 - 20)  SpO2: 96% (03 Apr 2018 07:19) (95% - 96%)    Gen: NAD,     Spine:  Dressing clean dry intact, changed this am by plastic surgery and both drains were discontinued  +EHL/FHL/TA/GS  +AIN/PIN/M/R/U/Msc/Ax  SILT L3-S1  SILT C5-T1  +Radial Pulse  Compartments soft  No calf TTP B/L

## 2020-10-19 ENCOUNTER — RESULT REVIEW (OUTPATIENT)
Age: 80
End: 2020-10-19

## 2021-06-17 PROBLEM — R20.0 ANESTHESIA OF SKIN: Chronic | Status: ACTIVE | Noted: 2018-03-16

## 2021-06-17 PROBLEM — C44.90 UNSPECIFIED MALIGNANT NEOPLASM OF SKIN, UNSPECIFIED: Chronic | Status: ACTIVE | Noted: 2018-03-16

## 2021-06-17 PROBLEM — M54.30 SCIATICA, UNSPECIFIED SIDE: Chronic | Status: ACTIVE | Noted: 2018-03-16

## 2021-06-17 PROBLEM — I10 ESSENTIAL (PRIMARY) HYPERTENSION: Chronic | Status: ACTIVE | Noted: 2018-03-16

## 2021-06-17 PROBLEM — M54.9 DORSALGIA, UNSPECIFIED: Chronic | Status: ACTIVE | Noted: 2018-03-16

## 2021-06-17 PROBLEM — M19.90 UNSPECIFIED OSTEOARTHRITIS, UNSPECIFIED SITE: Chronic | Status: ACTIVE | Noted: 2018-03-16

## 2021-07-12 ENCOUNTER — APPOINTMENT (OUTPATIENT)
Dept: PULMONOLOGY | Facility: CLINIC | Age: 81
End: 2021-07-12
Payer: MEDICARE

## 2021-07-12 VITALS
HEART RATE: 56 BPM | DIASTOLIC BLOOD PRESSURE: 78 MMHG | TEMPERATURE: 98 F | SYSTOLIC BLOOD PRESSURE: 137 MMHG | OXYGEN SATURATION: 92 % | BODY MASS INDEX: 27.8 KG/M2 | WEIGHT: 173 LBS | HEIGHT: 66 IN | RESPIRATION RATE: 15 BRPM

## 2021-07-12 VITALS — WEIGHT: 173 LBS | TEMPERATURE: 98.3 F | OXYGEN SATURATION: 97 % | BODY MASS INDEX: 27.8 KG/M2 | HEIGHT: 66 IN

## 2021-07-12 DIAGNOSIS — R60.0 LOCALIZED EDEMA: ICD-10-CM

## 2021-07-12 DIAGNOSIS — M48.00 SPINAL STENOSIS, SITE UNSPECIFIED: ICD-10-CM

## 2021-07-12 DIAGNOSIS — N40.0 BENIGN PROSTATIC HYPERPLASIA WITHOUT LOWER URINARY TRACT SYMPMS: ICD-10-CM

## 2021-07-12 DIAGNOSIS — E78.00 PURE HYPERCHOLESTEROLEMIA, UNSPECIFIED: ICD-10-CM

## 2021-07-12 DIAGNOSIS — J44.9 CHRONIC OBSTRUCTIVE PULMONARY DISEASE, UNSPECIFIED: ICD-10-CM

## 2021-07-12 DIAGNOSIS — G62.9 POLYNEUROPATHY, UNSPECIFIED: ICD-10-CM

## 2021-07-12 DIAGNOSIS — Z87.891 PERSONAL HISTORY OF NICOTINE DEPENDENCE: ICD-10-CM

## 2021-07-12 DIAGNOSIS — Z78.9 OTHER SPECIFIED HEALTH STATUS: ICD-10-CM

## 2021-07-12 DIAGNOSIS — I10 ESSENTIAL (PRIMARY) HYPERTENSION: ICD-10-CM

## 2021-07-12 PROCEDURE — 94729 DIFFUSING CAPACITY: CPT

## 2021-07-12 PROCEDURE — 94726 PLETHYSMOGRAPHY LUNG VOLUMES: CPT

## 2021-07-12 PROCEDURE — 94010 BREATHING CAPACITY TEST: CPT

## 2021-07-12 PROCEDURE — ZZZZZ: CPT

## 2021-07-12 PROCEDURE — 94618 PULMONARY STRESS TESTING: CPT

## 2021-07-12 PROCEDURE — 99205 OFFICE O/P NEW HI 60 MIN: CPT | Mod: 25

## 2021-07-12 RX ORDER — BUDESONIDE AND FORMOTEROL FUMARATE DIHYDRATE 160; 4.5 UG/1; UG/1
160-4.5 AEROSOL RESPIRATORY (INHALATION) TWICE DAILY
Qty: 1 | Refills: 5 | Status: ACTIVE | COMMUNITY
Start: 2021-07-12 | End: 1900-01-01

## 2021-07-12 RX ORDER — ALBUTEROL SULFATE 90 UG/1
108 (90 BASE) INHALANT RESPIRATORY (INHALATION)
Qty: 1 | Refills: 3 | Status: ACTIVE | COMMUNITY
Start: 2021-07-12 | End: 1900-01-01

## 2021-07-14 PROBLEM — G62.9 NEUROPATHY: Status: ACTIVE | Noted: 2021-07-14

## 2021-07-14 PROBLEM — Z78.9 SOCIAL ALCOHOL USE: Status: ACTIVE | Noted: 2021-07-14

## 2021-07-14 PROBLEM — N40.0 BPH (BENIGN PROSTATIC HYPERPLASIA): Status: ACTIVE | Noted: 2021-07-14

## 2021-07-14 PROBLEM — E78.00 HYPERCHOLESTEROLEMIA: Status: ACTIVE | Noted: 2021-07-14

## 2021-07-14 PROBLEM — R60.0 LOWER EXTREMITY EDEMA: Status: ACTIVE | Noted: 2021-07-14

## 2021-07-14 PROBLEM — M48.00 SPINAL STENOSIS: Status: ACTIVE | Noted: 2021-07-14

## 2021-07-14 PROBLEM — Z87.891 FORMER SMOKER: Status: ACTIVE | Noted: 2021-07-14

## 2021-07-14 PROBLEM — I10 BENIGN ESSENTIAL HYPERTENSION: Status: ACTIVE | Noted: 2021-07-14

## 2021-07-14 RX ORDER — TRIAMTERENE AND HYDROCHLOROTHIAZIDE 37.5; 25 MG/1; MG/1
37.5-25 CAPSULE ORAL
Qty: 30 | Refills: 0 | Status: ACTIVE | COMMUNITY
Start: 2021-06-03

## 2021-07-14 RX ORDER — TAMSULOSIN HYDROCHLORIDE 0.4 MG/1
0.4 CAPSULE ORAL
Qty: 90 | Refills: 0 | Status: ACTIVE | COMMUNITY
Start: 2020-05-21

## 2021-07-14 RX ORDER — GABAPENTIN 100 MG/1
100 CAPSULE ORAL
Qty: 90 | Refills: 0 | Status: ACTIVE | COMMUNITY
Start: 2021-04-08

## 2021-07-14 RX ORDER — FINASTERIDE 5 MG/1
5 TABLET, FILM COATED ORAL
Qty: 90 | Refills: 0 | Status: ACTIVE | COMMUNITY
Start: 2021-04-08

## 2021-07-14 RX ORDER — SIMVASTATIN 20 MG/1
20 TABLET, FILM COATED ORAL
Qty: 90 | Refills: 0 | Status: ACTIVE | COMMUNITY
Start: 2021-04-08

## 2021-07-14 RX ORDER — METOPROLOL SUCCINATE 50 MG/1
50 TABLET, EXTENDED RELEASE ORAL
Qty: 90 | Refills: 0 | Status: ACTIVE | COMMUNITY
Start: 2021-04-08

## 2021-07-14 NOTE — REASON FOR VISIT
[Initial] : an initial visit [Shortness of Breath] : shortness of breath [Family Member] : family member

## 2021-07-14 NOTE — PHYSICAL EXAM
[No Acute Distress] : no acute distress [Normal Oropharynx] : normal oropharynx [II] : Mallampati Class: II [Normal Appearance] : normal appearance [No Neck Mass] : no neck mass [Normal Rate/Rhythm] : normal rate/rhythm [Normal S1, S2] : normal s1, s2 [No Murmurs] : no murmurs [No Resp Distress] : no resp distress [Clear to Auscultation Bilaterally] : clear to auscultation bilaterally [Benign] : benign [No Cyanosis] : no cyanosis [No Edema] : no edema [No Focal Deficits] : no focal deficits [Oriented x3] : oriented x3 [Normal Affect] : normal affect [TextBox_99] : abnormal gait

## 2021-07-14 NOTE — HISTORY OF PRESENT ILLNESS
[Former] : former [>= 30 pack years] : >= 30 pack years [Never] : never [TextBox_4] : Mr. Isaac is a 80 year old male with a significant pmhx of spinal stenosis and previous smoking comes in for an evaluation of dyspnea on exertion. The patient indicates that he has noticed dyspnea on exertion since 2018 when he had his spinal stenosis surgery. He used to be an avid exerciser and worked as a  for most of his life. Recently, he has dyspnea with ambulation and even simple tasks as washing the dishes. He has some cough but not frequently with minimal sputum production. No history of other pulmonary abnormalities. \par \par The patient's daughter indicates that he had a CT scan done which did not show any significant findings and a TTE which also was not abnormal. \par \par Of note, he was exposed to [facility] as a .  [TextBox_11] : 1 [TextBox_13] : 30

## 2021-07-14 NOTE — PROCEDURE
[FreeTextEntry1] : Saturation at rest 96% room air with HR of 68 bpm\par Ambulation of less than 500 feet due to dyspnea and abnormal gait\par saturation 94% room air with HR of 84 bpm

## 2021-07-14 NOTE — ASSESSMENT
[FreeTextEntry1] : Mr. Isaac is an 80 year old male with pmhx of htn and spinal stenosis with abnormal gait who comes in for an evaluation of dyspnea. PFTs done in the office were of appropriate effort and suggest mild COPD. Evaluation of him ambulating while monitoring oxygen level demonstrated significant abnormal gait with his right leg abducting while walking. He did not have significant oxygen desaturation but was noticeably dyspneic. \par \par He had a TTE and CT chest done which I need to review in order to determine that there no other pathology that is causing this shortness of breath. If these cannot be reproduced, then I will have to order new ones in order to complete the evaluation. \par \par We will start with a trial of a LABA/LAMA combination given his history of BPH. He will try this for 1-2 months and then follow up with me.  with patient verbal instruction/written material

## 2021-07-14 NOTE — REVIEW OF SYSTEMS
[Dyspnea] : dyspnea [SOB on Exertion] : sob on exertion [Nocturia] : nocturia [Back Pain] : back pain [Focal Weakness] : focal weakness [Negative] : Endocrine

## 2021-10-13 ENCOUNTER — APPOINTMENT (OUTPATIENT)
Dept: DERMATOLOGY | Facility: CLINIC | Age: 81
End: 2021-10-13
Payer: MEDICARE

## 2021-10-13 ENCOUNTER — NON-APPOINTMENT (OUTPATIENT)
Age: 81
End: 2021-10-13

## 2021-10-13 PROCEDURE — 99204 OFFICE O/P NEW MOD 45 MIN: CPT

## 2021-10-14 NOTE — HISTORY OF PRESENT ILLNESS
[FreeTextEntry1] : Mohs Micrographic surgery consultation of a nodular BCC on the left lower conjunctival eyelid and eyelid margin [de-identified] : Mohs consultation date: 10/13/2021 \par \par Referred by: Dr. Kelly (oculoplastics)\par Dermatologist: Dr. Renner\par \par We had the pleasure of seeing your patient in consultation for Mohs Micrographic Surgery of a nodular BCC on the left lower conjunctival eyelid and eyelid margin\par Mr. KATIE CORREA is a 81 year old M who presents for nodular BCC on the left lower conjunctival eyelid and eyelid margin here with his daughter Niurka\par -Biopsy 6/17/21\par -prior history  of Mohs for melanoma on L lateral neck\par -history  of many BCCs and SCCs\par \par SH: Retired, lives alone with daughter nearby, former smoker (quit 30 years ago)\par Upcoming travel plans: none\par \par Pertinent positives noted below \par History of HIV or hepatitis: No\par Blood thinners: No\par Antibiotic Prophylaxis: None \par Medical implants: None\par \par The patient's review of systems questionnaire was reviewed. Education needs were identified. There were no barriers to learning.\par \par

## 2021-10-14 NOTE — PHYSICAL EXAM
[Alert] : alert [Oriented x 3] : ~L oriented x 3 [Well Nourished] : well nourished [Conjunctiva Non-injected] : conjunctiva non-injected [No Visual Lymphadenopathy] : no visual  lymphadenopathy [No Clubbing] : no clubbing [No Edema] : no edema [No Bromhidrosis] : no bromhidrosis [No Chromhidrosis] : no chromhidrosis [FreeTextEntry3] : 0.8 x 0.3 cm pink papule on left lower conjunctival eyelid and eyelid margin with extension into inner eyelid

## 2021-10-18 ENCOUNTER — APPOINTMENT (OUTPATIENT)
Dept: PULMONOLOGY | Facility: CLINIC | Age: 81
End: 2021-10-18
Payer: MEDICARE

## 2021-10-18 VITALS
HEART RATE: 61 BPM | RESPIRATION RATE: 16 BRPM | BODY MASS INDEX: 28.83 KG/M2 | DIASTOLIC BLOOD PRESSURE: 84 MMHG | SYSTOLIC BLOOD PRESSURE: 131 MMHG | WEIGHT: 179.38 LBS | OXYGEN SATURATION: 98 % | TEMPERATURE: 97.6 F | HEIGHT: 66 IN

## 2021-10-18 PROCEDURE — 99214 OFFICE O/P EST MOD 30 MIN: CPT

## 2021-10-18 NOTE — HISTORY OF PRESENT ILLNESS
[Former] : former [>= 30 pack years] : >= 30 pack years [Never] : never [TextBox_4] : Mr. Isaac is a 80 year old male with a significant pmhx of spinal stenosis and previous smoking comes in for a follow up. He states he has been noncompliant with his inhalers. Has not used his albuterol inhaler and only sporadically his Symbicort. His daughter indicates when he is wheezing when she forces him to take albuterol it helps with his appearance of shortness of breath. \par \par He brings in his CXR and CT scan which are normal except for a persistently elevated right hemidiaphragm. \par \par Of note:\par The patient indicates that he has noticed dyspnea on exertion since 2018 when he had his spinal stenosis surgery. He used to be an avid exerciser and worked as a  for most of his life. Recently, he has dyspnea with ambulation and even simple tasks as washing the dishes. He has some cough but not frequently with minimal sputum production. No history of other pulmonary abnormalities. \par \par The patient's daughter indicates that he had a CT scan done which did not show any significant findings and a TTE which also was not abnormal. \par \par Of note, he was exposed to [facility] as a .  [TextBox_11] : 1 [TextBox_13] : 30

## 2021-10-18 NOTE — ASSESSMENT
[FreeTextEntry1] : Mr. Isaac is an 80 year old male with pmhx of htn and spinal stenosis and COPD who comes in for a follow up. He is noncompliant with his inhalers. Reinforced the need to use inhalers to determine if they work or do not work for him. PFTs done in the office were of appropriate effort and suggest mild COPD. \par \par Will reassess strength with the patient if his inhalers do not work and see if he followed up with a neurologist at next vitsit. \par \par CT nondiagnostic except for a slightly elevated right hemidiaphragm which is also apparent in CXR. \par \par Cont trial of a LABA/LAMA combination given his history of BPH. He will try this for 3months and then follow up with me.

## 2021-11-16 ENCOUNTER — LABORATORY RESULT (OUTPATIENT)
Age: 81
End: 2021-11-16

## 2021-11-16 ENCOUNTER — NON-APPOINTMENT (OUTPATIENT)
Age: 81
End: 2021-11-16

## 2021-11-16 ENCOUNTER — APPOINTMENT (OUTPATIENT)
Dept: DERMATOLOGY | Facility: CLINIC | Age: 81
End: 2021-11-16
Payer: MEDICARE

## 2021-11-16 DIAGNOSIS — C44.91 BASAL CELL CARCINOMA OF SKIN, UNSPECIFIED: ICD-10-CM

## 2021-11-16 PROCEDURE — 17311 MOHS 1 STAGE H/N/HF/G: CPT

## 2021-11-16 PROCEDURE — 17312 MOHS ADDL STAGE: CPT

## 2021-12-06 ENCOUNTER — APPOINTMENT (OUTPATIENT)
Dept: PULMONOLOGY | Facility: CLINIC | Age: 81
End: 2021-12-06
Payer: MEDICARE

## 2021-12-06 VITALS
WEIGHT: 171.5 LBS | OXYGEN SATURATION: 98 % | HEART RATE: 73 BPM | SYSTOLIC BLOOD PRESSURE: 124 MMHG | DIASTOLIC BLOOD PRESSURE: 75 MMHG | TEMPERATURE: 97.3 F | HEIGHT: 66 IN | BODY MASS INDEX: 27.56 KG/M2 | RESPIRATION RATE: 17 BRPM

## 2021-12-06 DIAGNOSIS — R06.00 DYSPNEA, UNSPECIFIED: ICD-10-CM

## 2021-12-06 DIAGNOSIS — J44.9 CHRONIC OBSTRUCTIVE PULMONARY DISEASE, UNSPECIFIED: ICD-10-CM

## 2021-12-06 PROCEDURE — 99214 OFFICE O/P EST MOD 30 MIN: CPT

## 2021-12-06 NOTE — HISTORY OF PRESENT ILLNESS
[Former] : former [>= 30 pack years] : >= 30 pack years [Never] : never [TextBox_4] : Mr. Isaac is a 81 year old male with a significant pmhx of spinal stenosis and previous smoking comes in for a follow up.  Patient indicates that he does not have any significant difference while using his inhalers.  Daughter who is the encounter indicates that the patient has been using Symbicort twice a day as prescribed.  Still having shortness of breath associated with exertion.  Patient does ride a bike and get short of breath when rides up hills.  Does not take albuterol as needed for dyspnea.  Recently had a skin cancer removed and needed preoperative clearance from cardiology.  Though never underwent and exercise stress test, patient was told that his cardiac function was normal.\par \par \par Of note:\par The patient indicates that he has noticed dyspnea on exertion since 2018 when he had his spinal stenosis surgery. He used to be an avid exerciser and worked as a  for most of his life. Recently, he has dyspnea with ambulation and even simple tasks as washing the dishes. He has some cough but not frequently with minimal sputum production. No history of other pulmonary abnormalities. \par \par The patient's daughter indicates that he had a CT scan done which did not show any significant findings and a TTE which also was not abnormal.  CAT scan was done at [facility] radiology.\par \par Of note, he was exposed to [facility] as a .  [TextBox_11] : 1 [TextBox_13] : 30

## 2021-12-06 NOTE — ASSESSMENT
[FreeTextEntry1] : Mr. Isaac is an 81 year old male with pmhx of htn and spinal stenosis and COPD who comes in for a follow up.  Patient is currently compliant with Symbicort.  Not using albuterol inhaler as needed.  Still having dyspnea with exertion.  Patient is 81 years old and I am unsure if the type of exercise that he is doing can be kept up from a cardiac standpoint.  Given his history of obstructive physiology, will undergo a cardiopulmonary stress test to determine if his etiology of dyspnea is more cardiac or pulmonary in nature.  If this is identified as being pulmonary, then may consider using a LAMA though I am hesitant given his BPH.  I will follow up with him after his cardiopulmonary stress test.\par

## 2022-03-16 NOTE — PHYSICAL THERAPY INITIAL EVALUATION ADULT - LEVEL OF CONSCIOUSNESS, REHAB EVAL
Minocycline Counseling: Patient advised regarding possible photosensitivity and discoloration of the teeth, skin, lips, tongue and gums.  Patient instructed to avoid sunlight, if possible.  When exposed to sunlight, patients should wear protective clothing, sunglasses, and sunscreen.  The patient was instructed to call the office immediately if the following severe adverse effects occur:  hearing changes, easy bruising/bleeding, severe headache, or vision changes.  The patient verbalized understanding of the proper use and possible adverse effects of minocycline.  All of the patient's questions and concerns were addressed. Dapsone Pregnancy And Lactation Text: This medication is Pregnancy Category C and is not considered safe during pregnancy or breast feeding. Topical Clindamycin Counseling: Patient counseled that this medication may cause skin irritation or allergic reactions.  In the event of skin irritation, the patient was advised to reduce the amount of the drug applied or use it less frequently.   The patient verbalized understanding of the proper use and possible adverse effects of clindamycin.  All of the patient's questions and concerns were addressed. Include Pregnancy/Lactation Warning?: No Tazorac Pregnancy And Lactation Text: This medication is not safe during pregnancy. It is unknown if this medication is excreted in breast milk. Erythromycin Pregnancy And Lactation Text: This medication is Pregnancy Category B and is considered safe during pregnancy. It is also excreted in breast milk. Spironolactone Counseling: Patient advised regarding risks of diarrhea, abdominal pain, hyperkalemia, birth defects (for female patients), liver toxicity and renal toxicity. The patient may need blood work to monitor liver and kidney function and potassium levels while on therapy. The patient verbalized understanding of the proper use and possible adverse effects of spironolactone.  All of the patient's questions and concerns were addressed. Azelaic Acid Counseling: Patient counseled that medicine may cause skin irritation and to avoid applying near the eyes.  In the event of skin irritation, the patient was advised to reduce the amount of the drug applied or use it less frequently.   The patient verbalized understanding of the proper use and possible adverse effects of azelaic acid.  All of the patient's questions and concerns were addressed. Bactrim Counseling:  I discussed with the patient the risks of sulfa antibiotics including but not limited to GI upset, allergic reaction, drug rash, diarrhea, dizziness, photosensitivity, and yeast infections.  Rarely, more serious reactions can occur including but not limited to aplastic anemia, agranulocytosis, methemoglobinemia, blood dyscrasias, liver or kidney failure, lung infiltrates or desquamative/blistering drug rashes. Benzoyl Peroxide Pregnancy And Lactation Text: This medication is Pregnancy Category C. It is unknown if benzoyl peroxide is excreted in breast milk. Azelaic Acid Pregnancy And Lactation Text: This medication is considered safe during pregnancy and breast feeding. Topical Retinoid counseling:  Patient advised to apply a pea-sized amount only at bedtime and wait 30 minutes after washing their face before applying.  If too drying, patient may add a non-comedogenic moisturizer. The patient verbalized understanding of the proper use and possible adverse effects of retinoids.  All of the patient's questions and concerns were addressed. Topical Clindamycin Pregnancy And Lactation Text: This medication is Pregnancy Category B and is considered safe during pregnancy. It is unknown if it is excreted in breast milk. Tetracycline Counseling: Patient counseled regarding possible photosensitivity and increased risk for sunburn.  Patient instructed to avoid sunlight, if possible.  When exposed to sunlight, patients should wear protective clothing, sunglasses, and sunscreen.  The patient was instructed to call the office immediately if the following severe adverse effects occur:  hearing changes, easy bruising/bleeding, severe headache, or vision changes.  The patient verbalized understanding of the proper use and possible adverse effects of tetracycline.  All of the patient's questions and concerns were addressed. Patient understands to avoid pregnancy while on therapy due to potential birth defects. Detail Level: Zone Isotretinoin Counseling: Patient should get monthly blood tests, not donate blood, not drive at night if vision affected, not share medication, and not undergo elective surgery for 6 months after tx completed. Side effects reviewed, pt to contact office should one occur. Spironolactone Pregnancy And Lactation Text: This medication can cause feminization of the male fetus and should be avoided during pregnancy. The active metabolite is also found in breast milk. Bactrim Pregnancy And Lactation Text: This medication is Pregnancy Category D and is known to cause fetal risk.  It is also excreted in breast milk. Doxycycline Counseling:  Patient counseled regarding possible photosensitivity and increased risk for sunburn.  Patient instructed to avoid sunlight, if possible.  When exposed to sunlight, patients should wear protective clothing, sunglasses, and sunscreen.  The patient was instructed to call the office immediately if the following severe adverse effects occur:  hearing changes, easy bruising/bleeding, severe headache, or vision changes.  The patient verbalized understanding of the proper use and possible adverse effects of doxycycline.  All of the patient's questions and concerns were addressed. Minocycline Pregnancy And Lactation Text: This medication is Pregnancy Category D and not consider safe during pregnancy. It is also excreted in breast milk. Topical Retinoid Pregnancy And Lactation Text: This medication is Pregnancy Category C. It is unknown if this medication is excreted in breast milk. Doxycycline Pregnancy And Lactation Text: This medication is Pregnancy Category D and not consider safe during pregnancy. It is also excreted in breast milk but is considered safe for shorter treatment courses. Sarecycline Counseling: Patient advised regarding possible photosensitivity and discoloration of the teeth, skin, lips, tongue and gums.  Patient instructed to avoid sunlight, if possible.  When exposed to sunlight, patients should wear protective clothing, sunglasses, and sunscreen.  The patient was instructed to call the office immediately if the following severe adverse effects occur:  hearing changes, easy bruising/bleeding, severe headache, or vision changes.  The patient verbalized understanding of the proper use and possible adverse effects of sarecycline.  All of the patient's questions and concerns were addressed. Topical Sulfur Applications Counseling: Topical Sulfur Counseling: Patient counseled that this medication may cause skin irritation or allergic reactions.  In the event of skin irritation, the patient was advised to reduce the amount of the drug applied or use it less frequently.   The patient verbalized understanding of the proper use and possible adverse effects of topical sulfur application.  All of the patient's questions and concerns were addressed. Azithromycin Counseling:  I discussed with the patient the risks of azithromycin including but not limited to GI upset, allergic reaction, drug rash, diarrhea, and yeast infections. High Dose Vitamin A Counseling: Side effects reviewed, pt to contact office should one occur. Birth Control Pills Pregnancy And Lactation Text: This medication should be avoided if pregnant and for the first 30 days post-partum. Birth Control Pills Counseling: Birth Control Pill Counseling: I discussed with the patient the potential side effects of OCPs including but not limited to increased risk of stroke, heart attack, thrombophlebitis, deep venous thrombosis, hepatic adenomas, breast changes, GI upset, headaches, and depression.  The patient verbalized understanding of the proper use and possible adverse effects of OCPs. All of the patient's questions and concerns were addressed. Isotretinoin Pregnancy And Lactation Text: This medication is Pregnancy Category X and is considered extremely dangerous during pregnancy. It is unknown if it is excreted in breast milk. Winlevi Counseling:  I discussed with the patient the risks of topical clascoterone including but not limited to erythema, scaling, itching, and stinging. Patient voiced their understanding. Erythromycin Counseling:  I discussed with the patient the risks of erythromycin including but not limited to GI upset, allergic reaction, drug rash, diarrhea, increase in liver enzymes, and yeast infections. Azithromycin Pregnancy And Lactation Text: This medication is considered safe during pregnancy and is also secreted in breast milk. Benzoyl Peroxide Counseling: Patient counseled that medicine may cause skin irritation and bleach clothing.  In the event of skin irritation, the patient was advised to reduce the amount of the drug applied or use it less frequently.   The patient verbalized understanding of the proper use and possible adverse effects of benzoyl peroxide.  All of the patient's questions and concerns were addressed. alert Dapsone Counseling: I discussed with the patient the risks of dapsone including but not limited to hemolytic anemia, agranulocytosis, rashes, methemoglobinemia, kidney failure, peripheral neuropathy, headaches, GI upset, and liver toxicity.  Patients who start dapsone require monitoring including baseline LFTs and weekly CBCs for the first month, then every month thereafter.  The patient verbalized understanding of the proper use and possible adverse effects of dapsone.  All of the patient's questions and concerns were addressed. High Dose Vitamin A Pregnancy And Lactation Text: High dose vitamin A therapy is contraindicated during pregnancy and breast feeding. Winlevi Pregnancy And Lactation Text: This medication is considered safe during pregnancy and breastfeeding. Tazorac Counseling:  Patient advised that medication is irritating and drying.  Patient may need to apply sparingly and wash off after an hour before eventually leaving it on overnight.  The patient verbalized understanding of the proper use and possible adverse effects of tazorac.  All of the patient's questions and concerns were addressed. Topical Sulfur Applications Pregnancy And Lactation Text: This medication is Pregnancy Category C and has an unknown safety profile during pregnancy. It is unknown if this topical medication is excreted in breast milk. Detail Level: Simple Detail Level: Detailed

## 2023-07-14 NOTE — CONSULT NOTE ADULT - SUBJECTIVE AND OBJECTIVE BOX
Patient/Caregiver provided printed discharge information. Patient is a 77y old  Male who presents with a chief complaint of L2-S1 Laminectomy and Fusion (29 Mar 2018 13:31)      INTERVAL HPI/OVERNIGHT EVENTS:  T(C): 36.8 (18 @ 16:45), Max: 37.1 (18 @ 08:30)  HR: 67 (18 @ 16:45) (54 - 91)  BP: 103/59 (18 @ 16:45) (103/59 - 171/99)  RR: 18 (18 @ 16:45) (8 - 25)  SpO2: 95% (18 @ 16:45) (88% - 98%)  Wt(kg): --  I&O's Summary    29 Mar 2018 07:  -  30 Mar 2018 07:00  --------------------------------------------------------  IN: 3655 mL / OUT: 2440 mL / NET: 1215 mL    30 Mar 2018 07:01  -  30 Mar 2018 17:18  --------------------------------------------------------  IN: 100 mL / OUT: 1210 mL / NET: -1110 mL        PAST MEDICAL & SURGICAL HISTORY:  Skin cancer  Sciatica  HTN (hypertension)  Numbness  Back pain  Arthritis  [place] (hard [place] hearing): right hearing aid  Spinal stenosis in cervical region  BPH (benign prostatic hypertrophy)  Elective surgery: Mohs  neck 2018  Elective surgery: cataract extraction   Elective surgery: cervical fusion   H/O left knee surgery:  - repair of torn meniscus  Inguinal hernia: h/o repair - right      SOCIAL HISTORY  Alcohol:  Tobacco:  Illicit substance use:      FAMILY HISTORY:      LABS:                        12.3   14.1  )-----------( 204      ( 30 Mar 2018 06:08 )             36.3         138  |  105  |  24<H>  ----------------------------<  139<H>  4.5   |  24  |  1.40<H>    Ca    7.7<L>      30 Mar 2018 06:08  Phos  3.2     03-30  Mg     1.8     03-30        Urinalysis Basic - ( 29 Mar 2018 17:52 )    Color: Red / Appearance: Turbid / S.015 / pH: x  Gluc: x / Ketone: Small  / Bili: Negative / Urobili: Negative   Blood: x / Protein: 75 mg/dL / Nitrite: Negative   Leuk Esterase: Trace / RBC: >50 /HPF / WBC 6-10   Sq Epi: x / Non Sq Epi: Occasional / Bacteria: Few      CAPILLARY BLOOD GLUCOSE            Urinalysis Basic - ( 29 Mar 2018 17:52 )    Color: Red / Appearance: Turbid / S.015 / pH: x  Gluc: x / Ketone: Small  / Bili: Negative / Urobili: Negative   Blood: x / Protein: 75 mg/dL / Nitrite: Negative   Leuk Esterase: Trace / RBC: >50 /HPF / WBC 6-10   Sq Epi: x / Non Sq Epi: Occasional / Bacteria: Few        MEDICATIONS  (STANDING):  amLODIPine   Tablet 2.5 milliGRAM(s) Oral daily  calcium carbonate 1250 mG + Vitamin D (OsCal 500 + D) 1 Tablet(s) Oral three times a day  ceFAZolin   IVPB 2000 milliGRAM(s) IV Intermittent every 8 hours  docusate sodium 100 milliGRAM(s) Oral three times a day  finasteride 5 milliGRAM(s) Oral daily  tamsulosin 0.4 milliGRAM(s) Oral daily    MEDICATIONS  (PRN):  acetaminophen   Tablet 650 milliGRAM(s) Oral every 6 hours PRN For Temp greater than 38 C (100.4 F)  acetaminophen   Tablet. 650 milliGRAM(s) Oral every 6 hours PRN Headache  aluminum hydroxide/magnesium hydroxide/simethicone Suspension 30 milliLiter(s) Oral every 12 hours PRN Indigestion  benzocaine 15 mG/menthol 3.6 mG Lozenge 1 Lozenge Oral every 2 hours PRN Sore Throat  diazepam    Tablet 5 milliGRAM(s) Oral every 8 hours PRN Muscle Spasms  HYDROmorphone  Injectable 1 milliGRAM(s) IV Push every 4 hours PRN Severe Pain (7 - 10)  magnesium hydroxide Suspension 30 milliLiter(s) Oral every 12 hours PRN Constipation  ondansetron Injectable 4 milliGRAM(s) IV Push every 6 hours PRN Nausea  oxyCODONE    IR 5 milliGRAM(s) Oral every 4 hours PRN Mild Pain (1 - 3)  oxyCODONE    IR 10 milliGRAM(s) Oral every 4 hours PRN Moderate Pain (4 - 6)      REVIEW OF SYSTEMS:  CONSTITUTIONAL: No fever, weight loss, or fatigue  EYES: No eye pain, visual disturbances, or discharge  ENMT:  No difficulty hearing, tinnitus, vertigo; No sinus or throat pain  NECK: No pain or stiffness  RESPIRATORY: No cough, wheezing, chills or hemoptysis; No shortness of breath  CARDIOVASCULAR: No chest pain, palpitations, dizziness, or leg swelling  GASTROINTESTINAL: No abdominal or epigastric pain. No nausea, vomiting, or hematemesis; No diarrhea or constipation. No melena or hematochezia.  GENITOURINARY: No dysuria, frequency, hematuria, or incontinence  NEUROLOGICAL: No headaches, memory loss, loss of strength, numbness, or tremors  SKIN: No itching, burning, rashes, or lesions   MUSCULOSKELETAL: spinal pain at the area of surgery  RADIOLOGY & ADDITIONAL TESTS:    Imaging Personally Reviewed:  [ ] YES  [ ] NO    Consultant(s) Notes Reviewed:  [ ] YES  [ ] NO    PHYSICAL EXAM:  GENERAL: NAD, well-groomed, well-developed  HEAD:  Atraumatic, Normocephalic  EYES: EOMI, PERRLA, conjunctiva and sclera clear  ENMT: No tonsillar erythema, exudates, or enlargement; Moist mucous membranes, Good dentition, No lesions  NECK: Supple, No JVD, Normal thyroid  NERVOUS SYSTEM:  Alert & Oriented X3, Good concentration; Motor Strength 5/5 B/L upper and lower extremities; DTRs 2+ intact and symmetric  CHEST/LUNG: Clear to percussion bilaterally; No rales, rhonchi, wheezing, or rubs  HEART: Regular rate and rhythm; No murmurs, rubs, or gallops  ABDOMEN: Soft, Nontender, Nondistended; Bowel sounds present  EXTREMITIES:  2+ Peripheral Pulses, No clubbing, cyanosis, or edema pt with good strength on flexion and exptension at the ankle joint      Care Discussed with Consultants/Other Providers [ ] YES  [ ] NO

## 2024-11-25 NOTE — PHYSICAL THERAPY INITIAL EVALUATION ADULT - PATIENT/FAMILY/SIGNIFICANT OTHER GOALS STATEMENT, PT EVAL
Patient attended Phase 2 Cardiac Rehab Exercise Session. Further documentation will be scanned into the medical record upon discharge.   
To feel better

## 2025-03-21 NOTE — DISCHARGE NOTE ADULT - HOSPITAL COURSE
The patient is a 89 year old male status post laminectomy and fusion of L2-S1. The patient was medically optimized for the previously mentioned surgical procedure. The patient was taken to the operating room on date mentioned above. Prophylactic antibiotics were started before the procedure and continued for 24 hours. There were no complications during the procedure and the patient tolerated the procedure well. The patient was transfered to the recovery room in stable condition and subsequently to the surgical floor. The patient was given SCDs for DVT prophylaxis. All home medications were continued. The patient received physical therpay daily and daily labs were followed. The dressing was kept clean, dry, intact and changed appropriately. The drain was removed when output appropriately decreased. The rest of the hospital stay was unremarkable. The patient was discharged in stable condition to follow up outpatient.
None